# Patient Record
Sex: FEMALE | Race: WHITE | NOT HISPANIC OR LATINO | Employment: OTHER | URBAN - NONMETROPOLITAN AREA
[De-identification: names, ages, dates, MRNs, and addresses within clinical notes are randomized per-mention and may not be internally consistent; named-entity substitution may affect disease eponyms.]

---

## 2023-05-10 PROBLEM — M54.16 LUMBAR RADICULAR PAIN: Status: ACTIVE | Noted: 2023-05-10

## 2023-05-10 PROBLEM — N39.0 ACUTE UTI: Status: RESOLVED | Noted: 2023-05-10 | Resolved: 2023-05-10

## 2023-05-10 PROBLEM — N28.1 RENAL CYST: Status: ACTIVE | Noted: 2023-05-10

## 2023-05-10 PROBLEM — N30.01 ACUTE CYSTITIS WITH HEMATURIA: Status: ACTIVE | Noted: 2023-05-10

## 2023-05-10 PROBLEM — R30.0 DYSURIA: Status: RESOLVED | Noted: 2023-05-10 | Resolved: 2023-05-10

## 2023-05-10 PROBLEM — E78.5 HYPERLIPIDEMIA: Status: ACTIVE | Noted: 2023-05-10

## 2023-05-10 PROBLEM — R07.2 PRECORDIAL PAIN: Status: ACTIVE | Noted: 2023-05-10

## 2023-05-10 PROBLEM — H40.9 GLAUCOMA: Status: ACTIVE | Noted: 2023-05-10

## 2023-05-10 PROBLEM — R35.0 INCREASED FREQUENCY OF URINATION: Status: ACTIVE | Noted: 2023-05-10

## 2023-05-10 PROBLEM — R41.3 MEMORY LOSS: Status: ACTIVE | Noted: 2023-05-10

## 2023-05-10 PROBLEM — N39.3 SUI (STRESS URINARY INCONTINENCE, FEMALE): Status: ACTIVE | Noted: 2023-05-10

## 2023-05-10 PROBLEM — I10 BENIGN HYPERTENSION: Status: ACTIVE | Noted: 2023-05-10

## 2023-05-10 RX ORDER — ASPIRIN 81 MG/1
1 TABLET ORAL DAILY
COMMUNITY
Start: 2021-11-09

## 2023-05-10 RX ORDER — LISINOPRIL AND HYDROCHLOROTHIAZIDE 10; 12.5 MG/1; MG/1
1 TABLET ORAL DAILY
COMMUNITY
End: 2023-05-11 | Stop reason: SINTOL

## 2023-05-10 RX ORDER — CIPROFLOXACIN 250 MG/1
1 TABLET, FILM COATED ORAL EVERY 12 HOURS
COMMUNITY
Start: 2022-11-10 | End: 2023-05-11 | Stop reason: ALTCHOICE

## 2023-05-10 RX ORDER — ATORVASTATIN CALCIUM 40 MG/1
1 TABLET, FILM COATED ORAL DAILY
COMMUNITY
Start: 2021-11-09 | End: 2023-12-06 | Stop reason: SDUPTHER

## 2023-05-11 ENCOUNTER — OFFICE VISIT (OUTPATIENT)
Dept: PRIMARY CARE | Facility: CLINIC | Age: 73
End: 2023-05-11
Payer: MEDICARE

## 2023-05-11 VITALS
WEIGHT: 135.4 LBS | HEIGHT: 63 IN | OXYGEN SATURATION: 93 % | DIASTOLIC BLOOD PRESSURE: 80 MMHG | SYSTOLIC BLOOD PRESSURE: 110 MMHG | HEART RATE: 71 BPM | BODY MASS INDEX: 23.99 KG/M2

## 2023-05-11 DIAGNOSIS — E78.2 MIXED HYPERLIPIDEMIA: ICD-10-CM

## 2023-05-11 DIAGNOSIS — I10 BENIGN HYPERTENSION: ICD-10-CM

## 2023-05-11 DIAGNOSIS — N39.3 SUI (STRESS URINARY INCONTINENCE, FEMALE): Primary | ICD-10-CM

## 2023-05-11 DIAGNOSIS — R35.0 INCREASED FREQUENCY OF URINATION: ICD-10-CM

## 2023-05-11 LAB
POC APPEARANCE, URINE: CLEAR
POC BILIRUBIN, URINE: NEGATIVE
POC BLOOD, URINE: ABNORMAL
POC COLOR, URINE: YELLOW
POC GLUCOSE, URINE: NEGATIVE MG/DL
POC KETONES, URINE: NEGATIVE MG/DL
POC LEUKOCYTES, URINE: ABNORMAL
POC NITRITE,URINE: NEGATIVE
POC PH, URINE: 5.5 PH
POC PROTEIN, URINE: NEGATIVE MG/DL
POC SPECIFIC GRAVITY, URINE: 1.01
POC UROBILINOGEN, URINE: 0.2 EU/DL

## 2023-05-11 PROCEDURE — 1160F RVW MEDS BY RX/DR IN RCRD: CPT | Performed by: FAMILY MEDICINE

## 2023-05-11 PROCEDURE — 81003 URINALYSIS AUTO W/O SCOPE: CPT | Performed by: FAMILY MEDICINE

## 2023-05-11 PROCEDURE — 1159F MED LIST DOCD IN RCRD: CPT | Performed by: FAMILY MEDICINE

## 2023-05-11 PROCEDURE — 3079F DIAST BP 80-89 MM HG: CPT | Performed by: FAMILY MEDICINE

## 2023-05-11 PROCEDURE — 1036F TOBACCO NON-USER: CPT | Performed by: FAMILY MEDICINE

## 2023-05-11 PROCEDURE — 3074F SYST BP LT 130 MM HG: CPT | Performed by: FAMILY MEDICINE

## 2023-05-11 PROCEDURE — 99214 OFFICE O/P EST MOD 30 MIN: CPT | Performed by: FAMILY MEDICINE

## 2023-05-11 RX ORDER — HYDROCHLOROTHIAZIDE 12.5 MG/1
12.5 TABLET ORAL DAILY
Qty: 30 TABLET | Refills: 5 | Status: SHIPPED | OUTPATIENT
Start: 2023-05-11 | End: 2023-05-30 | Stop reason: SINTOL

## 2023-05-11 RX ORDER — LATANOPROST 50 UG/ML
SOLUTION/ DROPS OPHTHALMIC
COMMUNITY
Start: 2022-11-10

## 2023-05-11 RX ORDER — OXYBUTYNIN CHLORIDE 5 MG/1
5 TABLET, EXTENDED RELEASE ORAL DAILY
Qty: 30 TABLET | Refills: 11 | Status: SHIPPED | OUTPATIENT
Start: 2023-05-11 | End: 2023-08-07

## 2023-05-11 NOTE — PROGRESS NOTES
"Subjective   Patient ID: Penny Quevedo is a 72 y.o. female who presents for 6 mo fu.    HPI   Since the last office visit there have been no interval operations, hospitalizations, important illnesses or injuries.  HTN-Takes and tolerates meds without side effects. No alcohol. no tobacco. no exercise. low salt.  Reviewed recommendation for 150 minutes of exercise per week including 2 days of weight training if over age 50  Hyperlipidemia- is on a statin and a prudent diet.  Still with urinary freq. Nocturia x1, usi   Cough 6-8x a day will washout lisonprila nd use hydrochlorothiazide alone  Review of Systems  General-no fatigue weight to within 10 pounds  ENT no problems with vision swallowing  Cardiac no chest pains palpitations change in exercise tolerance or capacity  Pulmonary no cough shortness of breath  GI no heartburn or abdominal pain  Musculoskeletal no joint pains    Objective   /80   Pulse 71   Ht 1.6 m (5' 3\")   Wt 61.4 kg (135 lb 6.4 oz)   SpO2 93%   BMI 23.99 kg/m²     Physical Exam  General:  Alert, No acute distress. Appears stated age  Eye:  Pupils are equal, round and reactive to light, Extraocular movements are intact, Normal conjunctiva.    Neck:  Supple, Non-tender, No carotid bruit, No jugular venous distention, No lymphadenopathy, No thyromegaly.    Respiratory:  Lungs are clear to auscultation, Respirations are non-labored, Breath sounds are equal.    Cardiovascular:  Normal rate, Regular rhythm, No murmur.    Gastrointestinal:  Soft, Non-tender, No organomegaly. No solid or pulsatile mass  Integumentary:  Warm, Dry. No concerning lesions on exposed areas  Neurologic:  Alert, Oriented.  Gross and fine motor intact, CN 2-12 intact  Psychiatric:  Cooperative, Appropriate mood & affect.    Assessment/Plan          "

## 2023-05-30 ENCOUNTER — OFFICE VISIT (OUTPATIENT)
Dept: PRIMARY CARE | Facility: CLINIC | Age: 73
End: 2023-05-30
Payer: MEDICARE

## 2023-05-30 VITALS
WEIGHT: 135.5 LBS | HEIGHT: 63 IN | BODY MASS INDEX: 24.01 KG/M2 | OXYGEN SATURATION: 96 % | SYSTOLIC BLOOD PRESSURE: 110 MMHG | DIASTOLIC BLOOD PRESSURE: 58 MMHG | HEART RATE: 76 BPM

## 2023-05-30 DIAGNOSIS — R35.0 FREQUENCY OF URINATION: ICD-10-CM

## 2023-05-30 DIAGNOSIS — I10 BENIGN HYPERTENSION: Primary | ICD-10-CM

## 2023-05-30 LAB
POC APPEARANCE, URINE: ABNORMAL
POC BILIRUBIN, URINE: NEGATIVE
POC BLOOD, URINE: ABNORMAL
POC COLOR, URINE: YELLOW
POC GLUCOSE, URINE: NEGATIVE MG/DL
POC KETONES, URINE: NEGATIVE MG/DL
POC LEUKOCYTES, URINE: ABNORMAL
POC NITRITE,URINE: POSITIVE
POC PH, URINE: 7 PH
POC PROTEIN, URINE: NEGATIVE MG/DL
POC SPECIFIC GRAVITY, URINE: 1.01
POC UROBILINOGEN, URINE: 0.2 EU/DL

## 2023-05-30 PROCEDURE — 99214 OFFICE O/P EST MOD 30 MIN: CPT | Performed by: FAMILY MEDICINE

## 2023-05-30 PROCEDURE — 3074F SYST BP LT 130 MM HG: CPT | Performed by: FAMILY MEDICINE

## 2023-05-30 PROCEDURE — 3078F DIAST BP <80 MM HG: CPT | Performed by: FAMILY MEDICINE

## 2023-05-30 PROCEDURE — 1160F RVW MEDS BY RX/DR IN RCRD: CPT | Performed by: FAMILY MEDICINE

## 2023-05-30 PROCEDURE — 1036F TOBACCO NON-USER: CPT | Performed by: FAMILY MEDICINE

## 2023-05-30 PROCEDURE — 1159F MED LIST DOCD IN RCRD: CPT | Performed by: FAMILY MEDICINE

## 2023-05-30 PROCEDURE — 81003 URINALYSIS AUTO W/O SCOPE: CPT | Performed by: FAMILY MEDICINE

## 2023-05-30 RX ORDER — CIPROFLOXACIN 250 MG/1
250 TABLET, FILM COATED ORAL 2 TIMES DAILY
Qty: 14 TABLET | Refills: 3 | Status: SHIPPED | OUTPATIENT
Start: 2023-05-30 | End: 2023-06-06

## 2023-05-30 RX ORDER — LOSARTAN POTASSIUM 25 MG/1
25 TABLET ORAL DAILY
Qty: 30 TABLET | Refills: 11 | Status: SHIPPED | OUTPATIENT
Start: 2023-05-30 | End: 2023-08-07 | Stop reason: SINTOL

## 2023-05-30 NOTE — PROGRESS NOTES
"Subjective   Patient ID: Penny Quevedo is a 72 y.o. female who presents for UTI and BP concerns.    HPI   Uti f,urge,d,backain,  macrobid not helopful.  Cipro has been effective    HTN-home readings 135-150/ 60s. Renteria form hydrochlorothiazide, prev cough on lisinopril, will try losartan    Review of Systems general-no fatigue weight to within 10 pounds  ENT no problems with vision swallowing  Cardiac no chest pains palpitations change in exercise tolerance or capacity  Pulmonary no cough shortness of breath  GI no heartburn or abdominal pain  Musculoskeletal no joint pains    Objective   /58   Pulse 76   Ht 1.6 m (5' 3\")   Wt 61.5 kg (135 lb 8 oz)   SpO2 96%   BMI 24.00 kg/m²     Physical Exam  Heart regular without murmur.  Lungs clear to auscultation.  Neck no bruit thyroid nontender.  Assessment/Plan   Problem List Items Addressed This Visit          Circulatory    Benign hypertension - Primary    Relevant Medications    losartan (Cozaar) 25 mg tablet     Other Visit Diagnoses       Frequency of urination        Relevant Medications    ciprofloxacin (Cipro) 250 mg tablet    Other Relevant Orders    POCT UA Automated manually resulted (Completed)             Call bp in a month  \trial losartan 25  Cipro for uti  "

## 2023-06-19 ENCOUNTER — TELEPHONE (OUTPATIENT)
Dept: PRIMARY CARE | Facility: CLINIC | Age: 73
End: 2023-06-19
Payer: MEDICARE

## 2023-06-19 NOTE — TELEPHONE ENCOUNTER
CHANGED TO LOSARTAN  25MG   THE END OF MAY.   BP AVERAGE 130-170/ 70*80  .   NOT FEELING ANY BETTER.   GETTING HA'S

## 2023-06-27 ENCOUNTER — TELEPHONE (OUTPATIENT)
Dept: PRIMARY CARE | Facility: CLINIC | Age: 73
End: 2023-06-27
Payer: MEDICARE

## 2023-06-27 NOTE — TELEPHONE ENCOUNTER
PATIENT CALLED TO INFORM THAT HER DAUGHTER-IN-LAW IS ON LIFE SUPPORT. AN ISOLATED BP READING REPORTED 165/94. SHOULD SHE RESUME THE LISINOPRIL? OR, ADD THE LISINOPRIL TO THE LOSARTAN? SHE WILL BE LEAVING IN A FEW HOURS TO HEAD TO [place]

## 2023-08-07 ENCOUNTER — OFFICE VISIT (OUTPATIENT)
Dept: PRIMARY CARE | Facility: CLINIC | Age: 73
End: 2023-08-07
Payer: MEDICARE

## 2023-08-07 VITALS
BODY MASS INDEX: 23.96 KG/M2 | WEIGHT: 135.2 LBS | DIASTOLIC BLOOD PRESSURE: 74 MMHG | HEART RATE: 67 BPM | HEIGHT: 63 IN | OXYGEN SATURATION: 96 % | SYSTOLIC BLOOD PRESSURE: 136 MMHG

## 2023-08-07 DIAGNOSIS — I10 BENIGN HYPERTENSION: Primary | ICD-10-CM

## 2023-08-07 PROCEDURE — 1036F TOBACCO NON-USER: CPT | Performed by: NURSE PRACTITIONER

## 2023-08-07 PROCEDURE — 99213 OFFICE O/P EST LOW 20 MIN: CPT | Performed by: NURSE PRACTITIONER

## 2023-08-07 PROCEDURE — 3075F SYST BP GE 130 - 139MM HG: CPT | Performed by: NURSE PRACTITIONER

## 2023-08-07 PROCEDURE — 3078F DIAST BP <80 MM HG: CPT | Performed by: NURSE PRACTITIONER

## 2023-08-07 PROCEDURE — 1159F MED LIST DOCD IN RCRD: CPT | Performed by: NURSE PRACTITIONER

## 2023-08-07 PROCEDURE — 1160F RVW MEDS BY RX/DR IN RCRD: CPT | Performed by: NURSE PRACTITIONER

## 2023-08-07 RX ORDER — AMLODIPINE BESYLATE 5 MG/1
5 TABLET ORAL DAILY
Qty: 30 TABLET | Refills: 11 | Status: SHIPPED | OUTPATIENT
Start: 2023-08-07 | End: 2023-09-11 | Stop reason: DRUGHIGH

## 2023-08-07 ASSESSMENT — PATIENT HEALTH QUESTIONNAIRE - PHQ9
SUM OF ALL RESPONSES TO PHQ9 QUESTIONS 1 AND 2: 0
1. LITTLE INTEREST OR PLEASURE IN DOING THINGS: NOT AT ALL
2. FEELING DOWN, DEPRESSED OR HOPELESS: NOT AT ALL

## 2023-08-07 ASSESSMENT — ENCOUNTER SYMPTOMS
HEADACHES: 1
DIZZINESS: 0
LIGHT-HEADEDNESS: 0
PALPITATIONS: 0

## 2023-08-07 NOTE — PROGRESS NOTES
"Subjective   Patient ID: Penny Quevedo is a 73 y.o. female who presents for Hypertension (140's / 70-90's readings ).    Penny comes to the office, with her , to discuss elevated blood pressures.  States has a history of longstanding hypertension in which she took lisinopril for several years.  She did develop a cough on that medication it was discontinued and changed to losartan.  She stopped losartan back in June as she felt it caused brain fogginess and headaches.  She has been checking her blood pressures at home and getting  140's/70-90's.  States that she can feel a pressure in her head when her systolic blood pressure goes above 160's.  Previously had trial discontinuing losartan and restarting and symptoms returned.  Endorses no chest pain/syncopal episodes/visual disturbances  + fxhx HTN  Last labs from October 2022         Review of Systems   Eyes:  Negative for visual disturbance.   Cardiovascular:  Negative for chest pain, palpitations and leg swelling.   Neurological:  Positive for headaches. Negative for dizziness, syncope and light-headedness.       Objective   /74   Pulse 67   Ht 1.6 m (5' 3\")   Wt 61.3 kg (135 lb 3.2 oz)   SpO2 96%   BMI 23.95 kg/m²     Physical Exam  Vitals and nursing note reviewed.   Constitutional:       Appearance: Normal appearance.   Neck:      Thyroid: No thyromegaly.   Cardiovascular:      Rate and Rhythm: Normal rate and regular rhythm.      Heart sounds: Normal heart sounds.   Pulmonary:      Effort: Pulmonary effort is normal.      Breath sounds: Normal breath sounds.   Musculoskeletal:      Cervical back: Normal range of motion.      Right lower leg: No edema.      Left lower leg: No edema.   Skin:     General: Skin is warm and dry.   Neurological:      General: No focal deficit present.      Mental Status: She is alert and oriented to person, place, and time.   Psychiatric:         Mood and Affect: Mood normal.         Thought Content: Thought " content normal.         Assessment/Plan   Problem List Items Addressed This Visit       Benign hypertension - Primary    Relevant Medications    amLODIPine (Norvasc) 5 mg tablet    Other Relevant Orders    Follow Up In Primary Care - Established     1. Benign hypertension  Follow Up In Primary Care - Established    amLODIPine (Norvasc) 5 mg tablet    Start amlodipine 5 mg by mouth daily, check blood pressure 2-3 times per week and record.  Office visit in 4 to 6 weeks for recheck

## 2023-08-07 NOTE — PATIENT INSTRUCTIONS
Start amlodipine 5 mg by mouth daily  Check blood pressure 2-3 times per week and record.  Office visit in 4 to 6 weeks  Discussed weight loss, reduction of sodium intake to less than 2.4G daily and 30' physical activity most days of the week. Discussed DASH eating plan with avoidance of foods high in saturated/trans fats, limit sugar-sweetened beverages/foods, increase vegetable/fruit/whole grain consumption. Choose low-fat dairy products, fish, poultry, beans & nuts.

## 2023-09-10 ASSESSMENT — ENCOUNTER SYMPTOMS
HEADACHES: 0
WEAKNESS: 0
PALPITATIONS: 0
DIZZINESS: 0
LIGHT-HEADEDNESS: 0
SHORTNESS OF BREATH: 0

## 2023-09-10 NOTE — PROGRESS NOTES
"Subjective   Patient ID: Penny Quevedo is a 73 y.o. female who presents for Hypertension (Pt states blood pressure running higher than she would like ).    Penny comes to the office for a 1MO virgil on HTN.  States has a history of longstanding hypertension in which she took lisinopril for several years.  She did develop a cough on that medication it was discontinued and changed to losartan.  She stopped losartan back in June as she felt it caused brain fogginess and headaches.   States that she can feel a pressure in her head when her systolic blood pressure goes above 160's.  Previously had trial discontinuing losartan and restarting and symptoms returned.  Endorses no chest pain/syncopal episodes/visual disturbances. Started on Amlodipine 5mg 1 MO ago. Community BP Readings: 135-138/70-80's. Taking & tolerating Amlodipine.  Physical activity: walking daily  Schedule MMG after 11/30/23  Keep OV w/ MDS in Nov.  Depression screening completed today.  PHQ-2 score: 0.  Will re-evaluate annually and as needed           Review of Systems   HENT:          + head pressure w/ elevated BP readings   Eyes:  Negative for visual disturbance.   Respiratory:  Negative for shortness of breath.    Cardiovascular:  Negative for chest pain, palpitations and leg swelling.   Gastrointestinal:  Negative for constipation.   Neurological:  Negative for dizziness, weakness, light-headedness and headaches.   Psychiatric/Behavioral:          + recent increased stress in life       Objective   /70   Pulse 72   Ht 1.6 m (5' 3\")   Wt 62.2 kg (137 lb 1.6 oz)   SpO2 95%   BMI 24.29 kg/m²     Physical Exam  Vitals and nursing note reviewed.   Constitutional:       Appearance: Normal appearance.   HENT:      Head: Normocephalic.   Neck:      Vascular: No carotid bruit.   Cardiovascular:      Rate and Rhythm: Normal rate and regular rhythm.      Heart sounds: Normal heart sounds.   Pulmonary:      Effort: Pulmonary effort is normal. "      Breath sounds: Normal breath sounds.   Musculoskeletal:      Cervical back: Normal range of motion.      Right lower leg: No edema.      Left lower leg: No edema.   Skin:     General: Skin is warm and dry.   Neurological:      General: No focal deficit present.      Mental Status: She is alert and oriented to person, place, and time.   Psychiatric:         Mood and Affect: Mood normal.         Thought Content: Thought content normal.         Assessment/Plan   Problem List Items Addressed This Visit       Benign hypertension    Relevant Medications    amLODIPine (Norvasc) 10 mg tablet    Encounter for screening mammogram for malignant neoplasm of breast - Primary    Relevant Orders    BI mammo bilateral screening tomosynthesis

## 2023-09-11 ENCOUNTER — OFFICE VISIT (OUTPATIENT)
Dept: PRIMARY CARE | Facility: CLINIC | Age: 73
End: 2023-09-11
Payer: MEDICARE

## 2023-09-11 ENCOUNTER — TELEPHONE (OUTPATIENT)
Dept: PRIMARY CARE | Facility: CLINIC | Age: 73
End: 2023-09-11

## 2023-09-11 VITALS
SYSTOLIC BLOOD PRESSURE: 128 MMHG | HEART RATE: 72 BPM | OXYGEN SATURATION: 95 % | DIASTOLIC BLOOD PRESSURE: 70 MMHG | BODY MASS INDEX: 24.29 KG/M2 | WEIGHT: 137.1 LBS | HEIGHT: 63 IN

## 2023-09-11 DIAGNOSIS — Z12.31 ENCOUNTER FOR SCREENING MAMMOGRAM FOR MALIGNANT NEOPLASM OF BREAST: Primary | ICD-10-CM

## 2023-09-11 DIAGNOSIS — I10 BENIGN HYPERTENSION: ICD-10-CM

## 2023-09-11 PROCEDURE — 99213 OFFICE O/P EST LOW 20 MIN: CPT | Performed by: NURSE PRACTITIONER

## 2023-09-11 PROCEDURE — G0444 DEPRESSION SCREEN ANNUAL: HCPCS | Performed by: NURSE PRACTITIONER

## 2023-09-11 PROCEDURE — 1159F MED LIST DOCD IN RCRD: CPT | Performed by: NURSE PRACTITIONER

## 2023-09-11 PROCEDURE — 1160F RVW MEDS BY RX/DR IN RCRD: CPT | Performed by: NURSE PRACTITIONER

## 2023-09-11 PROCEDURE — 3074F SYST BP LT 130 MM HG: CPT | Performed by: NURSE PRACTITIONER

## 2023-09-11 PROCEDURE — 1036F TOBACCO NON-USER: CPT | Performed by: NURSE PRACTITIONER

## 2023-09-11 PROCEDURE — 3078F DIAST BP <80 MM HG: CPT | Performed by: NURSE PRACTITIONER

## 2023-09-11 RX ORDER — AMLODIPINE BESYLATE 10 MG/1
10 TABLET ORAL DAILY
Qty: 90 TABLET | Refills: 3 | Status: SHIPPED | OUTPATIENT
Start: 2023-09-11 | End: 2024-09-10

## 2023-09-11 ASSESSMENT — PATIENT HEALTH QUESTIONNAIRE - PHQ9
SUM OF ALL RESPONSES TO PHQ9 QUESTIONS 1 AND 2: 0
2. FEELING DOWN, DEPRESSED OR HOPELESS: NOT AT ALL
1. LITTLE INTEREST OR PLEASURE IN DOING THINGS: NOT AT ALL

## 2023-09-11 ASSESSMENT — ENCOUNTER SYMPTOMS: CONSTIPATION: 0

## 2023-09-11 NOTE — PATIENT INSTRUCTIONS
Increase amlodipine to 10 mg by mouth daily.  Check blood pressures 2-3 times per week and record.  Keep appointment with Dr. Mohamud in November.  Complete your mammography in December  For any new medications that were prescribed today, the patient was educated about their indications for use, administration, frequency and potential side effects of the medication.

## 2023-09-11 NOTE — TELEPHONE ENCOUNTER
SCHED BENJAMING ON [facility] AT [facility] FOR 12/04/23 AT 11:10.  PT GIVEN INSTRUCTIONS.

## 2023-11-28 ENCOUNTER — LAB (OUTPATIENT)
Dept: LAB | Facility: LAB | Age: 73
End: 2023-11-28
Payer: MEDICARE

## 2023-11-28 DIAGNOSIS — I10 BENIGN HYPERTENSION: ICD-10-CM

## 2023-11-28 DIAGNOSIS — E78.2 MIXED HYPERLIPIDEMIA: ICD-10-CM

## 2023-11-28 LAB
ALBUMIN SERPL BCP-MCNC: 4.2 G/DL (ref 3.4–5)
ALP SERPL-CCNC: 142 U/L (ref 33–136)
ALT SERPL W P-5'-P-CCNC: 16 U/L (ref 7–45)
ANION GAP SERPL CALC-SCNC: 12 MMOL/L (ref 10–20)
AST SERPL W P-5'-P-CCNC: 21 U/L (ref 9–39)
BILIRUB SERPL-MCNC: 0.6 MG/DL (ref 0–1.2)
BUN SERPL-MCNC: 25 MG/DL (ref 6–23)
CALCIUM SERPL-MCNC: 9.7 MG/DL (ref 8.6–10.3)
CHLORIDE SERPL-SCNC: 105 MMOL/L (ref 98–107)
CHOLEST SERPL-MCNC: 147 MG/DL (ref 0–199)
CHOLESTEROL/HDL RATIO: 2.5
CO2 SERPL-SCNC: 28 MMOL/L (ref 21–32)
CREAT SERPL-MCNC: 0.76 MG/DL (ref 0.5–1.05)
ERYTHROCYTE [DISTWIDTH] IN BLOOD BY AUTOMATED COUNT: 13.5 % (ref 11.5–14.5)
GFR SERPL CREATININE-BSD FRML MDRD: 82 ML/MIN/1.73M*2
GLUCOSE SERPL-MCNC: 89 MG/DL (ref 74–99)
HCT VFR BLD AUTO: 42.8 % (ref 36–46)
HDLC SERPL-MCNC: 59 MG/DL
HGB BLD-MCNC: 13.4 G/DL (ref 12–16)
LDLC SERPL CALC-MCNC: 75 MG/DL
MCH RBC QN AUTO: 29.3 PG (ref 26–34)
MCHC RBC AUTO-ENTMCNC: 31.3 G/DL (ref 32–36)
MCV RBC AUTO: 94 FL (ref 80–100)
NON HDL CHOLESTEROL: 88 MG/DL (ref 0–149)
NRBC BLD-RTO: 0 /100 WBCS (ref 0–0)
PLATELET # BLD AUTO: 177 X10*3/UL (ref 150–450)
POTASSIUM SERPL-SCNC: 4.1 MMOL/L (ref 3.5–5.3)
PROT SERPL-MCNC: 6.9 G/DL (ref 6.4–8.2)
RBC # BLD AUTO: 4.57 X10*6/UL (ref 4–5.2)
SODIUM SERPL-SCNC: 141 MMOL/L (ref 136–145)
TRIGL SERPL-MCNC: 64 MG/DL (ref 0–149)
VLDL: 13 MG/DL (ref 0–40)
WBC # BLD AUTO: 6.5 X10*3/UL (ref 4.4–11.3)

## 2023-11-28 PROCEDURE — 80053 COMPREHEN METABOLIC PANEL: CPT

## 2023-11-28 PROCEDURE — 85027 COMPLETE CBC AUTOMATED: CPT

## 2023-11-28 PROCEDURE — 80061 LIPID PANEL: CPT

## 2023-11-28 PROCEDURE — 36415 COLL VENOUS BLD VENIPUNCTURE: CPT

## 2023-12-04 ENCOUNTER — APPOINTMENT (OUTPATIENT)
Dept: RADIOLOGY | Facility: CLINIC | Age: 73
End: 2023-12-04
Payer: MEDICARE

## 2023-12-04 ENCOUNTER — TELEPHONE (OUTPATIENT)
Dept: PRIMARY CARE | Facility: CLINIC | Age: 73
End: 2023-12-04
Payer: MEDICARE

## 2023-12-04 ENCOUNTER — ANCILLARY PROCEDURE (OUTPATIENT)
Dept: RADIOLOGY | Facility: CLINIC | Age: 73
End: 2023-12-04
Payer: MEDICARE

## 2023-12-04 DIAGNOSIS — Z12.31 ENCOUNTER FOR SCREENING MAMMOGRAM FOR MALIGNANT NEOPLASM OF BREAST: ICD-10-CM

## 2023-12-04 PROCEDURE — 77067 SCR MAMMO BI INCL CAD: CPT | Mod: BILATERAL PROCEDURE | Performed by: RADIOLOGY

## 2023-12-04 PROCEDURE — 77067 SCR MAMMO BI INCL CAD: CPT

## 2023-12-04 PROCEDURE — 77063 BREAST TOMOSYNTHESIS BI: CPT | Mod: BILATERAL PROCEDURE | Performed by: RADIOLOGY

## 2023-12-04 NOTE — TELEPHONE ENCOUNTER
----- Message from ERICK Vega sent at 12/4/2023  3:48 PM EST -----  Please call and notify patient her mammogram is normal

## 2023-12-06 ENCOUNTER — OFFICE VISIT (OUTPATIENT)
Dept: PRIMARY CARE | Facility: CLINIC | Age: 73
End: 2023-12-06
Payer: MEDICARE

## 2023-12-06 VITALS
DIASTOLIC BLOOD PRESSURE: 80 MMHG | WEIGHT: 137.6 LBS | HEIGHT: 63 IN | OXYGEN SATURATION: 96 % | BODY MASS INDEX: 24.38 KG/M2 | SYSTOLIC BLOOD PRESSURE: 140 MMHG | HEART RATE: 71 BPM

## 2023-12-06 DIAGNOSIS — E78.2 MIXED HYPERLIPIDEMIA: ICD-10-CM

## 2023-12-06 DIAGNOSIS — I10 BENIGN HYPERTENSION: ICD-10-CM

## 2023-12-06 DIAGNOSIS — R35.0 INCREASED FREQUENCY OF URINATION: ICD-10-CM

## 2023-12-06 DIAGNOSIS — N39.3 SUI (STRESS URINARY INCONTINENCE, FEMALE): ICD-10-CM

## 2023-12-06 DIAGNOSIS — Z00.00 ROUTINE GENERAL MEDICAL EXAMINATION AT HEALTH CARE FACILITY: Primary | ICD-10-CM

## 2023-12-06 PROCEDURE — 1170F FXNL STATUS ASSESSED: CPT | Performed by: FAMILY MEDICINE

## 2023-12-06 PROCEDURE — 1159F MED LIST DOCD IN RCRD: CPT | Performed by: FAMILY MEDICINE

## 2023-12-06 PROCEDURE — 99214 OFFICE O/P EST MOD 30 MIN: CPT | Performed by: FAMILY MEDICINE

## 2023-12-06 PROCEDURE — 1160F RVW MEDS BY RX/DR IN RCRD: CPT | Performed by: FAMILY MEDICINE

## 2023-12-06 PROCEDURE — 1036F TOBACCO NON-USER: CPT | Performed by: FAMILY MEDICINE

## 2023-12-06 PROCEDURE — 3077F SYST BP >= 140 MM HG: CPT | Performed by: FAMILY MEDICINE

## 2023-12-06 PROCEDURE — 3079F DIAST BP 80-89 MM HG: CPT | Performed by: FAMILY MEDICINE

## 2023-12-06 PROCEDURE — G0439 PPPS, SUBSEQ VISIT: HCPCS | Performed by: FAMILY MEDICINE

## 2023-12-06 RX ORDER — ATORVASTATIN CALCIUM 40 MG/1
40 TABLET, FILM COATED ORAL DAILY
Qty: 90 TABLET | Refills: 3 | Status: SHIPPED | OUTPATIENT
Start: 2023-12-06 | End: 2024-12-05

## 2023-12-06 ASSESSMENT — ENCOUNTER SYMPTOMS
LOSS OF SENSATION IN FEET: 0
OCCASIONAL FEELINGS OF UNSTEADINESS: 0
DEPRESSION: 0

## 2023-12-06 ASSESSMENT — ACTIVITIES OF DAILY LIVING (ADL)
DOING_HOUSEWORK: INDEPENDENT
TAKING_MEDICATION: INDEPENDENT
MANAGING_FINANCES: INDEPENDENT
GROCERY_SHOPPING: INDEPENDENT
BATHING: INDEPENDENT
DRESSING: INDEPENDENT

## 2023-12-06 NOTE — PROGRESS NOTES
"Subjective   Patient ID: Penny Quevedo is a 73 y.o. female who presents for Medicare Annual Wellness Visit Subsequent.    HPI   Since the last office visit there have been no interval operations, hospitalizations, important illnesses or injuries.  HTN-Takes and tolerates meds without side effects. No alcohol. no tobacco. no exercise. low salt.  Reviewed recommendation for 150 minutes of exercise per week including 2 days of weight training if over age 50  Had reduced amlodiine to 5 mgand bp isavg <130/<75  Hyperlipidemia- is on a statin and a prudent diet.    Review of Systems  General-no fatigue weight to within 10 pounds  ENT no problems with vision swallowing  Cardiac no chest pains palpitations change in exercise tolerance or capacity  Pulmonary no cough shortness of breath  GI no heartburn or abdominal pain  Musculoskeletal no joint pains  Objective   /80   Pulse 71   Ht 1.6 m (5' 3\")   Wt 62.4 kg (137 lb 9.6 oz)   SpO2 96%   BMI 24.37 kg/m²     Physical Exam  General:  Alert, No acute distress. Appears stated age  Eye:  Pupils are equal, round and reactive to light, Extraocular movements are intact, Normal conjunctiva.    Neck:  Supple, Non-tender, No carotid bruit, No jugular venous distention, No lymphadenopathy, No thyromegaly.    Respiratory:  Lungs are clear to auscultation, Respirations are non-labored, Breath sounds are equal.    Cardiovascular:  Normal rate, Regular rhythm, No murmur.    Gastrointestinal:  Soft, Non-tender, No organomegaly. No solid or pulsatile mass  Integumentary:  Warm, Dry. No concerning lesions on exposed areas  Neurologic:  Alert, Oriented.  Gross and fine motor intact, CN 2-12 intact  Psychiatric:  Cooperative, Appropriate mood & affect.  Assessment/Plan   Problem List Items Addressed This Visit             ICD-10-CM    Benign hypertension I10    Relevant Orders    Follow Up In Primary Care    Hyperlipidemia E78.5    Relevant Medications    atorvastatin (Lipitor) " 40 mg tablet    Other Relevant Orders    Comprehensive Metabolic Panel    Gamma GT    Follow Up In Primary Care    Increased frequency of urination R35.0    PILAR (stress urinary incontinence, female) N39.3     Other Visit Diagnoses         Codes    Routine general medical examination at health care facility    -  Primary Z00.00

## 2023-12-06 NOTE — PROGRESS NOTES
"Subjective   Reason for Visit: Penny Quevedo is an 73 y.o. female here for a Medicare Wellness visit.     Past Medical, Surgical, and Family History reviewed and updated in chart.    Reviewed all medications by prescribing practitioner or clinical pharmacist (such as prescriptions, OTCs, herbal therapies and supplements) and documented in the medical record.    HPI    Patient Care Team:  Viktor Mohamud MD as PCP - General  Viktor Mohamud MD as PCP - [employer] PCP     Review of Systems    Objective   Vitals:  /80   Pulse 71   Ht 1.6 m (5' 3\")   Wt 62.4 kg (137 lb 9.6 oz)   SpO2 96%   BMI 24.37 kg/m²       Physical Exam    Assessment/Plan   Problem List Items Addressed This Visit     Benign hypertension    Hyperlipidemia    Increased frequency of urination    PILAR (stress urinary incontinence, female)   Other Visit Diagnoses     Routine general medical examination at health care facility    -  Primary             "

## 2024-01-04 ENCOUNTER — LAB (OUTPATIENT)
Dept: LAB | Facility: LAB | Age: 74
End: 2024-01-04
Payer: MEDICARE

## 2024-01-04 DIAGNOSIS — E78.2 MIXED HYPERLIPIDEMIA: ICD-10-CM

## 2024-01-04 LAB
ALBUMIN SERPL BCP-MCNC: 4.1 G/DL (ref 3.4–5)
ALP SERPL-CCNC: 143 U/L (ref 33–136)
ALT SERPL W P-5'-P-CCNC: 21 U/L (ref 7–45)
ANION GAP SERPL CALC-SCNC: 12 MMOL/L (ref 10–20)
AST SERPL W P-5'-P-CCNC: 24 U/L (ref 9–39)
BILIRUB SERPL-MCNC: 0.7 MG/DL (ref 0–1.2)
BUN SERPL-MCNC: 18 MG/DL (ref 6–23)
CALCIUM SERPL-MCNC: 9.4 MG/DL (ref 8.6–10.3)
CHLORIDE SERPL-SCNC: 105 MMOL/L (ref 98–107)
CO2 SERPL-SCNC: 29 MMOL/L (ref 21–32)
CREAT SERPL-MCNC: 0.7 MG/DL (ref 0.5–1.05)
GFR SERPL CREATININE-BSD FRML MDRD: >90 ML/MIN/1.73M*2
GGT SERPL-CCNC: 13 U/L (ref 5–55)
GLUCOSE SERPL-MCNC: 87 MG/DL (ref 74–99)
POTASSIUM SERPL-SCNC: 4 MMOL/L (ref 3.5–5.3)
PROT SERPL-MCNC: 6.4 G/DL (ref 6.4–8.2)
SODIUM SERPL-SCNC: 142 MMOL/L (ref 136–145)

## 2024-01-04 PROCEDURE — 80053 COMPREHEN METABOLIC PANEL: CPT

## 2024-01-04 PROCEDURE — 82977 ASSAY OF GGT: CPT

## 2024-01-04 PROCEDURE — 36415 COLL VENOUS BLD VENIPUNCTURE: CPT

## 2024-01-25 ENCOUNTER — TELEPHONE (OUTPATIENT)
Dept: PRIMARY CARE | Facility: CLINIC | Age: 74
End: 2024-01-25
Payer: MEDICARE

## 2024-03-15 ENCOUNTER — HOSPITAL ENCOUNTER (EMERGENCY)
Age: 74
LOS: 1 days | Discharge: HOME | End: 2024-03-16
Payer: MEDICARE

## 2024-03-15 VITALS
OXYGEN SATURATION: 97 % | SYSTOLIC BLOOD PRESSURE: 165 MMHG | RESPIRATION RATE: 16 BRPM | DIASTOLIC BLOOD PRESSURE: 80 MMHG | TEMPERATURE: 97 F | HEART RATE: 87 BPM

## 2024-03-15 VITALS — BODY MASS INDEX: 25.4 KG/M2

## 2024-03-15 VITALS
DIASTOLIC BLOOD PRESSURE: 66 MMHG | HEART RATE: 75 BPM | OXYGEN SATURATION: 95 % | SYSTOLIC BLOOD PRESSURE: 128 MMHG | RESPIRATION RATE: 16 BRPM

## 2024-03-15 DIAGNOSIS — Z79.82: ICD-10-CM

## 2024-03-15 DIAGNOSIS — I10: ICD-10-CM

## 2024-03-15 DIAGNOSIS — E78.5: ICD-10-CM

## 2024-03-15 DIAGNOSIS — S82.843A: Primary | ICD-10-CM
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
## 2024-03-15 LAB
ANION GAP: 5 (ref 5–15)
ANISOCYTOSIS BLD QL SMEAR: (no result)
BUN SERPL-MCNC: 24 MG/DL (ref 7–18)
BUN/CREAT RATIO: 31.2 RATIO (ref 10–20)
CALCIUM SERPL-MCNC: 8.9 MG/DL (ref 8.5–10.1)
CARBON DIOXIDE: 27 MMOL/L (ref 21–32)
CHLORIDE: 109 MMOL/L (ref 98–107)
DEPRECATED RDW RBC: 43.6 FL (ref 35.1–43.9)
DIFFERENTIAL INDICATED: (no result)
ERYTHROCYTE [DISTWIDTH] IN BLOOD: 13.4 % (ref 11.6–14.6)
EST GLOM FILT RATE - AFR AMER: 94 ML/MIN (ref 60–?)
ESTIMATED CREATININE CLEARANCE: 55.98 ML/MIN
GLUCOSE: 112 MG/DL (ref 74–106)
HCT VFR BLD AUTO: 41.3 % (ref 37–47)
HGB BLD-MCNC: 13.6 G/DL (ref 12–15)
IMMATURE GRANULOCYTES COUNT: 0.04 X10^3/UL (ref 0–0)
MCV RBC: 88.6 FL (ref 81–99)
MEAN CORP HGB CONC: 32.9 G/DL (ref 32–36)
MEAN PLATELET VOL.: 13.5 FL (ref 6.2–12)
NRBC FLAGGED BY ANALYZER: 0 % (ref 0–5)
PLATELET # BLD: 155 K/MM3 (ref 150–450)
POSITIVE COUNT: YES
POTASSIUM: 4 MMOL/L (ref 3.5–5.1)
RBC # BLD AUTO: 4.66 M/MM3 (ref 4.2–5.4)
RBC MORPH BLD: (no result) NORMAL
SCAN SMEAR PER REVIEW CRITERIA: (no result)
WBC # BLD AUTO: 12.9 K/MM3 (ref 4.4–11)

## 2024-03-15 PROCEDURE — 80048 BASIC METABOLIC PNL TOTAL CA: CPT

## 2024-03-15 PROCEDURE — A4216 STERILE WATER/SALINE, 10 ML: HCPCS

## 2024-03-15 PROCEDURE — 85025 COMPLETE CBC W/AUTO DIFF WBC: CPT

## 2024-03-15 PROCEDURE — 96374 THER/PROPH/DIAG INJ IV PUSH: CPT

## 2024-03-15 PROCEDURE — 73700 CT LOWER EXTREMITY W/O DYE: CPT

## 2024-03-15 PROCEDURE — 96375 TX/PRO/DX INJ NEW DRUG ADDON: CPT

## 2024-03-15 PROCEDURE — 29515 APPLICATION SHORT LEG SPLINT: CPT

## 2024-03-15 PROCEDURE — 73610 X-RAY EXAM OF ANKLE: CPT

## 2024-03-15 PROCEDURE — 99282 EMERGENCY DEPT VISIT SF MDM: CPT

## 2024-03-15 NOTE — XMS RPT_ITS
Comprehensive CCD (C-CDA v2.1)  
  
                           Created on: March 15, 2024  
  
  
ELLA CHARLES  
External Reference #: [id number]  
: 1950  
Sex: Female  
  
Demographics  
  
  
                                        Address             [address]  
[place]  [address]  
   
                                        Home Phone          [phone]  
   
                                        Preferred Language  en  
   
                                        Marital Status        
   
                                        M[employer] Affiliation Unknown  
   
                                        Race                White  
   
                                        Ethnic Group        Not  or Lati  
no  
  
  
Author  
  
  
                                        Name                Unknown  
   
                                        Address             [address]  
[address]  
[place]  [address]  
   
                                        Phone               [phone]  
   
                                        Organization        [facility]  
  
  
Care Team Providers  
  
  
                                Care Team Member Name Role            Phone  
   
                                Layo Colindres Unavailable     Unavailable  
   
                                StenLayo rico Unavailable     Unavailable  
   
                                Layo Colindres Unavailable     [phone]  
1  
   
                                Unavailable     Unavailable     [phone]  
   
                                TEMITOPE MCKENO Attending       Unavailab  
le  
   
                                TEMITOPE MCKEON Admitting       Unavailab  
le  
   
                                STENCEL, LAYO JOYCE Primary Care    Unavailab  
le  
   
                                StenLayo rico Primary Care Provider [phone]  
  
   
                                Unavailable     Unavailable     [phone]  
   
                                Bernadine, Layo Joyce Primary Care    Unavailab  
le  
   
                                Wood, MsRandy Chandler Attending       Unav  
ailable  
   
                                Wood, MsRandy Chandler Referring       Unav  
ailable  
   
                                Stencel, Layo Joyce Referring       Unavailab  
le  
   
                                Stencel, Layo Joyce Primary Care    Unavailab  
le  
   
                                Stencel, Layo Joyce Attending       Unavailab  
le  
   
                                Stencel, Layo Joyce Referring       Unavailab  
le  
   
                                Stencel, Layo Joyce Primary Care    Unavailab  
le  
   
                                Stencel, Layo Joyce Attending       Unavailab  
le  
   
                                Stencel, Layo Joyce Referring       Unavailab  
le  
   
                                Stencel, Layo Joyce Primary Care    Unavailab  
le  
   
                                Stencel, Layo Joyce Attending       Unavailab  
le  
   
                                Stencel, Layo Joyce Attending       Unavailab  
le  
   
                                Stencel, Layo Joyce Primary Care    Unavailab  
le  
   
                                StencelLayo Primary Care Provider [phone]  
  
   
                                Layo Colindres MD Primary Care Provider [phone]  
[phone]  
   
                                Layo Colindres MD Unavailable     [phone]  
[phone]  
   
                                Layo Colindres MD Primary Care Provider [phone]  
[phone]  
   
                                Layo Colindres MD Unavailable     [phone]  
[phone]  
   
                                Layo Colindres MD Primary Care Provider [phone]  
[phone]  
   
                                LAYO COLINDRES Primary Care    Unavailable  
   
                                ADA MERCADO II Attending       Unavailabl  
e  
   
                                STENCEL, LAYO D Primary Care    Unavailable  
   
                                COOPERRIDER II, ADA MARTIENZ Attending       Unavailabl  
e  
   
                                COOPERRIDER II, ADA MARTINEZ Referring       Unavailabl  
e  
   
                                COOPERRIDER II, ADA MARTINEZ Attending       Unavailabl  
e  
   
                                STENCEL, LAYO D Primary Care    Unavailable  
   
                                STENCEL, LAYO D Primary Care    Unavailable  
   
                                WOOD, ALEENA HERNADEZ Attending       Unavailable  
   
                                STENCEL, LAYO D Primary Care    Unavailable  
   
                                WOOD, ALEENA L Attending       Unavailable  
   
                                WOOD, ALEENA L Referring       Unavailable  
   
                                STENCEL, LAYO D Primary Care    Unavailable  
   
                                STENCEL, LAYO D Attending       Unavailable  
   
                                STENCEL, LAYO D Referring       Unavailable  
   
                                STENCEL, LAYO D Primary Care    Unavailable  
   
                                STENCEL, LAYO D Attending       Unavailable  
   
                                STENCEL, LAYO D Primary Care    Unavailable  
   
                                STENCEL, LAYO D Attending       Unavailable  
   
                                STENCEL, LAYO D Primary Care    Unavailable  
   
                                WOOD, ALEENA L Referring       Unavailable  
   
                                STENCEL, LAYO D Primary Care    Unavailable  
  
  
  
Medications  
Current Medications  
  
  
  
                      Medication Drug Class(es) Dates      Sig (Normalized) Sig   
(Original)  
   
                                                    aspirin 81 mg   
delayed release   
oral tablet  
(20 sources)                            Platelet   
Aggregation   
Inhibitor,   
Nonsteroidal   
Anti-inflammatory   
Drug                                    Start:   
2021                              take 1 tablet by   
mouth once daily                        aspirin 81 mg EC   
tablet Take 1   
tablet (81 mg) by   
mouth once daily.   
0 2021   
Active  
  
  
  
                                          
   
                                          
   
                                          
   
                                          
   
                                          
   
                                          
   
                                          
   
                                          
   
                                          
   
                                          
   
                                          
   
                                          
   
                                          
   
                                          
  
  
  
Completed/Discontinued Medications  
  
  
  
                      Medication Drug Class(es) Dates      Sig (Normalized) Sig   
(Original)  
   
                                                    amLODIPine 10 mg   
oral tablet  
(5 sources)                             Dihydropyridine   
Calcium Channel   
Blocker                                 Start:   
10-                              take 5 mg by mouth   
once                                    amLODIPine   
(NORVASC) 10 mg   
tablet Take 5 mg   
by mouth every   
afternoon. 0   
10/10/2023   
Active  
  
  
  
                                          
   
                                          
   
                                          
   
                                          
   
                                          
   
                                          
   
                                          
   
                                          
   
                                          
   
                                          
   
                                          
   
                                          
   
                                          
   
                                          
   
                                          
   
                                          
   
                                          
   
                                          
  
  
  
Problems  
Active Problems  
  
  
                      Problem Classification Problem    Date       Documented Da  
te Episodic/Chronic  
   
                                                    Cataract  
(2 sources)                             Bilateral senile   
combined form   
cataracts of eyes;   
Translations:   
[Combined forms of   
age-related   
cataract,   
bilateral]                                                  Chronic  
   
                                                    Disorders of lipid   
metabolism  
(20 sources)                            Hyperlipidemia;   
Translations:   
[Other and   
unspecified   
hyperlipidemia]                         Onset:   
05-                05-                Chronic  
   
                                                    Essential hypertension  
(20 sources)                            Benign   
hypertension;   
Translations:   
[Benign essential   
hypertension]                           Onset:   
05-                05-                Chronic  
   
                                                    Genitourinary symptoms   
and ill-defined   
conditions  
(20 sources)                            Female stress   
incontinence;   
Translations:   
[Stress   
incontinence,   
female]                                 Onset:   
05-                05-                Chronic  
   
                                                    Genitourinary symptoms   
and ill-defined   
conditions  
(20 sources)                            Increased frequency   
of urination;   
Translations:   
[Urinary frequency]                     Onset:   
05-  
Resolved:   
05-                05-                Episodic  
   
                                                    Glaucoma  
(20 sources)                            Glaucoma;   
Translations:   
[Unspecified   
glaucoma]                               Onset:   
2016                                          Chronic  
   
                                                    Immunizations and   
screening for   
infectious disease  
(20 sources)                            Patient encounter   
status;   
Translations:   
[Other specified   
vaccination]                            Onset:   
2023                Episodic  
   
                                                    Other eye disorders  
(1 source)                              Ptosis of eyelid;   
Translations:   
[Unspecified ptosis   
of bilateral   
eyelids]                                10-          Episodic  
   
                                                    Other screening for   
suspected conditions   
(not mental disorders   
or infectious disease)  
(8 sources)                             Encounter for   
screening mammogram   
for malignant   
neoplasm of breast;   
Translations:   
[Encntr screen   
mammogram for   
malignant neoplasm   
of breast]                              Onset:   
2022                                          Episodic  
   
                                                    Residual codes;   
unclassified  
(3 sources)                             Menopause present;   
Translations:   
[Menopause]                                                 Chronic  
   
                                                    Residual codes;   
unclassified  
(15 sources)                            Menopause present;   
Translations:   
[Symptomatic   
menopausal or   
female climacteric   
states]                                                     Episodic  
   
                                                    Residual codes;   
unclassified  
(3 sources)                             Body mass index   
20-24 - normal;   
Translations: [Body   
Mass Index between   
19-24, adult]                                               Episodic  
   
                                                    Residual codes;   
unclassified  
(2 sources)                             Other amnesia;   
Translations:   
[Other amnesia]                         Onset:   
2022                                          Episodic  
  
  
Past or Other Problems  
  
  
                      Problem Classification Problem    Date       Documented Da  
te Episodic/Chronic  
   
                                                    Blindness and vision   
defects  
(12 sources)                            Regular   
astigmatism;   
Translations:   
[Regular   
astigmatism,   
unspecified eye]                        Onset:   
04-                04-                Episodic  
   
                                                    Nonspecific chest pain  
(17 sources)                            Precordial pain;   
Translations:   
[Precordial pain]                       Onset:   
05-                05-                Episodic  
   
                                                    Other diseases of   
kidney and ureters  
(20 sources)                            Cyst of kidney;   
Translations:   
[Cystic kidney   
disease,   
unspecified]                            Onset:   
05-                05-                Episodic  
   
                                                    Other diseases of   
kidney and ureters  
(3 sources)                             Cyst of kidney;   
Translations:   
[Renal cyst]                                                  
   
                                                    Residual codes;   
unclassified  
(20 sources)                            Past history of   
procedure;   
Translations:   
[Other specified   
personal history   
presenting hazards   
to health]                              Onset:   
2020                                          Episodic  
  
  
  
                                          
   
                                          
   
                                          
   
                                          
   
                                          
   
                                          
   
                                          
   
                                          
  
  
  
Results  
  
  
                      Test Name  Value      Interpretation Reference Range Facil  
ity  
  
  
  
                                          
   
                                          
   
                                          
   
                                          
   
                                          
   
                                          
   
                                          
   
                                          
   
                                          
   
                                          
   
                                          
   
                                          
   
                                          
   
                                          
   
                                          
   
                                          
   
                                          
   
                                          
   
                                          
   
                                          
   
                                          
   
                                          
   
                                          
   
                                          
   
                                          
   
                                          
   
                                          
   
                                          
   
                                          
   
                                          
   
                                          
   
                                          
   
                                          
   
                                          
   
                                          
   
                                          
   
                                          
   
                                          
   
                                          
   
                                          
   
                                          
   
                                          
   
                                          
   
                                          
   
                                          
   
                                          
   
                                          
   
                                          
   
                                          
   
                                          
   
                                          
   
                                          
   
                                          
   
                                          
   
                                          
   
                                          
   
                                          
   
                                          
   
                                          
   
                                          
   
                                          
   
                                          
   
                                          
   
                                          
   
                                          
   
                                          
   
                                          
   
                                          
   
                                          
   
                                          
   
                                          
   
                                          
   
                                          
   
                                          
   
                                          
   
                                          
   
                                          
   
                                          
   
                                          
   
                                          
   
                                          
   
                                          
   
                                          
   
                                          
   
                                          
   
                                          
   
                                          
   
                                          
   
                                          
   
                                          
   
                                          
   
                                          
   
                                          
   
                                          
   
                                          
   
                                          
   
                                          
   
                                          
   
                                          
   
                                          
   
                                          
   
                                          
   
                                          
   
                                          
   
                                          
   
                                          
   
                                          
   
                                          
   
                                          
   
                                          
   
                                          
   
                                          
   
                                          
   
                                          
   
                                          
   
                                          
   
                                          
   
                                          
   
                                          
   
                                          
   
                                          
   
                                          
   
                                          
   
                                          
   
                                          
   
                                          
   
                                          
   
                                          
   
                                          
   
                                          
   
                                          
   
                                          
   
                                          
   
                                          
   
                                          
   
                                          
   
                                          
   
                                          
   
                                          
   
                                          
   
                                          
   
                                          
   
                                          
   
                                          
   
                                          
   
                                          
   
                                          
   
                                          
   
                                          
   
                                          
   
                                          
   
                                          
   
                                          
   
                                          
   
                                          
   
                                          
   
                                          
   
                                          
   
                                          
   
                                          
   
                                          
   
                                          
   
                                          
   
                                          
   
                                          
   
                                          
   
                                          
   
                                          
   
                                          
   
                                          
   
                                          
   
                                          
   
                                          
   
                                          
   
                                          
   
                                          
   
                                          
   
                                          
   
                                          
   
                                          
   
                                          
   
                                          
   
                                          
   
                                          
   
                                          
   
                                          
   
                                          
   
                                          
   
                                          
   
                                          
   
                                          
   
                                          
   
                                          
   
                                          
   
                                          
   
                                          
   
                                          
   
                                          
   
                                          
   
                                          
   
                                          
   
                                          
   
                                          
   
                                          
   
                                          
   
                                          
   
                                          
   
                                          
   
                                          
   
                                          
   
                                          
   
                                          
   
                                          
   
                                          
   
                                          
   
                                          
   
                                          
   
                                          
   
                                          
   
                                          
   
                                          
   
                                          
   
                                          
   
                                          
   
                                          
   
                                          
   
                                          
   
                                          
   
                                          
   
                                          
   
                                          
   
                                          
   
                                          
   
                                          
   
                                          
   
                                          
   
                                          
   
                                          
   
                                          
   
                                          
   
                                          
   
                                          
   
                                          
   
                                          
   
                                          
   
                                          
   
                                          
   
                                          
   
                                          
   
                                          
   
                                          
   
                                          
   
                                          
   
                                          
   
                                          
   
                                          
   
                                          
   
                                          
   
                                          
   
                                          
   
                                          
   
                                          
   
                                          
   
                                          
   
                                          
   
                                          
   
                                          
   
                                          
   
                                          
   
                                          
   
                                          
   
                                          
   
                                          
   
                                          
   
                                          
   
                                          
   
                                          
   
                                          
   
                                          
   
                                          
   
                                          
   
                                          
   
                                          
   
                                          
   
                                          
   
                                          
   
                                          
   
                                          
   
                                          
   
                                          
   
                                          
   
                                          
   
                                          
   
                                          
   
                                          
   
                                          
   
                                          
   
                                          
   
                                          
   
                                          
   
                                          
   
                                          
   
                                          
   
                                          
   
                                          
   
                                          
   
                                          
   
                                          
   
                                          
   
                                          
   
                                          
   
                                          
   
                                          
   
                                          
   
                                          
   
                                          
   
                                          
   
                                          
   
                                          
   
                                          
   
                                          
   
                                          
   
                                          
   
                                          
   
                                          
   
                                          
   
                                          
   
                                          
   
                                          
   
                                          
   
                                          
   
                                          
   
                                          
   
                                          
   
                                          
   
                                          
   
                                          
   
                                          
   
                                          
   
                                          
   
                                          
   
                                          
   
                                          
   
                                          
   
                                          
   
                                          
   
                                          
   
                                          
   
                                          
   
                                          
   
                                          
   
                                          
   
                                          
   
                                          
   
                                          
   
                                          
   
                                          
   
                                          
   
                                          
   
                                          
   
                                          
   
                                          
   
                                          
   
                                          
   
                                          
   
                                          
   
                                          
   
                                          
   
                                          
   
                                          
   
                                          
   
                                          
   
                                          
   
                                          
   
                                          
   
                                          
   
                                          
   
                                          
   
                                          
   
                                          
   
                                          
   
                                          
   
                                          
   
                                          
   
                                          
   
                                          
   
                                          
   
                                          
   
                                          
   
                                          
   
                                          
   
                                          
   
                                          
   
                                          
   
                                          
   
                                          
   
                                          
   
                                          
   
                                          
   
                                          
   
                                          
   
                                          
   
                                          
   
                                          
   
                                          
   
                                          
   
                                          
   
                                          
   
                                          
   
                                          
   
                                          
   
                                          
   
                                          
   
                                          
   
                                          
   
                                          
   
                                          
   
                                          
   
                                          
   
                                          
   
                                          
   
                                          
   
                                          
   
                                          
   
                                          
   
                                          
   
                                          
   
                                          
   
                                          
   
                                          
   
                                          
   
                                          
   
                                          
   
                                          
   
                                          
   
                                          
   
                                          
   
                                          
   
                                          
   
                                          
   
                                          
   
                                          
   
                                          
   
                                          
   
                                          
   
                                          
   
                                          
   
                                          
   
                                          
   
                                          
   
                                          
   
                                          
   
                                          
   
                                          
   
                                          
   
                                          
   
                                          
   
                                          
   
                                          
   
                                          
   
                                          
   
                                          
   
                                          
   
                                          
   
                                          
   
                                          
   
                                          
   
                                          
   
                                          
   
                                          
   
                                          
   
                                          
   
                                          
   
                                          
   
                                          
   
                                          
   
                                          
   
                                          
   
                                          
   
                                          
   
                                          
   
                                          
   
                                          
   
                                          
   
                                          
   
                                          
   
                                          
   
                                          
   
                                          
   
                                          
   
                                          
   
                                          
   
                                          
   
                                          
   
                                          
   
                                          
   
                                          
   
                                          
   
                                          
   
                                          
   
                                          
   
                                          
   
                                          
   
                                          
  
  
  
Vital Signs  
  
  
                          Date Time    Vital Sign   Value        Performing   
Clinician                               Facility  
   
                                                    2023   
09:          Body height         160 cm              Layo Colindres MD  
Work Phone:   
[phone]                          [facility]  
   
                                                    2023   
09:                              Body mass index (BMI)   
[Ratio]                   24.37 kg/m2               Layo Colindres MD  
Work Phone:   
[phone]                          [facility]  
   
                                                    2023   
09:          Body weight         62.41 kg            Layo Colindres MD  
Work Phone:   
[phone]                          [facility]  
   
                                                    2023   
09:                              Diastolic blood   
pressure                  80 mm[Hg]                 Layo Colindres MD  
Work Phone:   
[phone] [place] [address]  
   
                                                    2023   
09:          Heart rate          71 /min             Layo Colindres MD  
Work Phone:   
[phone]                          [facility]  
   
                                                    2023   
09:                              SaO2% (BldA) [Mass   
fraction]                 96 %                      Layo Colindres MD  
Work Phone:   
[phone]                          [facility]  
   
                                                    2023   
09:                              Systolic blood   
pressure                  140 mm[Hg]                Layo Colindres MD  
Work Phone:   
[phone] [place] [address]  
   
                                                    2023   
08:          Body height         160 cm              Aleena Wood   
APRN-CNP  
Work Phone:   
[phone] [place] [address]  
   
                                                    2023   
08:                              Body mass index (BMI)   
[Ratio]                   24.29 kg/m2               Aleena Wood   
APRN-CNP  
Work Phone:   
[phone] [place] [address]  
   
                                                    2023   
08:          Body weight         62.19 kg            Aleena Wood   
APRN-CNP  
Work Phone:   
[phone] [place] [address]  
   
                                                    2023   
08:                              Diastolic blood   
pressure                  70 mm[Hg]                 Aleena Wood   
APRN-CNP  
Work Phone:   
[phone] [place] [address]  
   
                                                    2023   
08:          Heart rate          72 /min             Aleena Wood   
APRN-CNP  
Work Phone:   
[phone] [place] [address]  
   
                                                    2023   
08:                              SaO2% (BldA) [Mass   
fraction]                 95 %                      Aleena Wood   
APRN-CNP  
Work Phone:   
[phone] [address] [place], [place] [address]  
   
                                                    2023   
08:                              Systolic blood   
pressure                  128 mm[Hg]                Aleena Wood   
APRN-CNP  
Work Phone:   
[phone]                          [facility]  
   
                                                    2023   
09:          Body height         160 cm              Aleena Wood   
APRN-CNP  
Work Phone:   
[phone]                          [facility]  
   
                                                    2023   
09:                              Body mass index (BMI)   
[Ratio]                   23.95 kg/m2               Aleena Wood   
APRN-CNP  
Work Phone:   
[phone]                          [facility]  
   
                                                    2023   
09:          Body weight         61.33 kg            Aleena Wood   
APRN-CNP  
Work Phone:   
[phone]                          [facility]  
   
                                                    2023   
09:                              Diastolic blood   
pressure                  74 mm[Hg]                 Aleena Wood   
APRN-CNP  
Work Phone:   
[phone]                          [facility]  
   
                                                    2023   
09:          Heart rate          67 /min             Aleena Wood   
APRN-CNP  
Work Phone:   
[phone]                          [facility]  
   
                                                    2023   
09:                              SaO2% (BldA) [Mass   
fraction]                 96 %                      Aleena Wood   
APRN-CNP  
Work Phone:   
[phone]                          [facility]  
   
                                                    2023   
09:                              Systolic blood   
pressure                  136 mm[Hg]                Aleena Wood   
APRN-CNP  
Work Phone:   
[phone]                          [facility]  
   
                                                    2023   
10:          Body height         160 cm              Layo Colindres MD  
Work Phone:   
[phone]                          [facility]  
   
                                                    2023   
10:                              Body mass index (BMI)   
[Ratio]                   24 kg/m2                  Layo Colindres MD  
Work Phone:   
[phone]                          [facility]  
   
                                                    2023   
10:          Body weight         61.46 kg            Layo Colindres MD  
Work Phone:   
[phone]                          [facility]  
   
                                                    2023   
10:                              Diastolic blood   
pressure                  58 mm[Hg]                 Layo Colindres MD  
Work Phone:   
[phone]                          [facility]  
   
                                                    2023   
10:          Heart rate          76 /min             Layo Colindres MD  
Work Phone:   
[phone]                          [facility]  
   
                                                    2023   
10:                              SaO2% (BldA) [Mass   
fraction]                 96 %                      Layo Colindres MD  
Work Phone:   
[phone]                          [facility]  
   
                                                    2023   
10:                              Systolic blood   
pressure                  110 mm[Hg]                Layo Colindres MD  
Work Phone:   
[phone]                          [facility]  
   
                                                    2023   
09:          Body height         160 cm              Layo Colindres MD  
Work Phone:   
[phone]                          [facility]  
   
                                                    2023   
09:                              Body mass index (BMI)   
[Ratio]                   23.99 kg/m2               Layo Colindres MD  
Work Phone:   
[phone]                          [facility]  
   
                                                    2023   
09:          Body weight         61.42 kg            Layo Colindres MD  
Work Phone:   
[phone]                          [facility]  
   
                                                    2023   
09:                              Diastolic blood   
pressure                  80 mm[Hg]                 Layo Colindres MD  
Work Phone:   
[phone]                          [facility]  
   
                                                    2023   
09:          Heart rate          71 /min             Layo Colindres MD  
Work Phone:   
[phone]                          [facility]  
   
                                                    2023   
09:                              SaO2% (BldA) [Mass   
fraction]                 93 %                      Layo Colindres MD  
Work Phone:   
[phone]                          [facility]  
   
                                                    2023   
09:                              Systolic blood   
pressure                  110 mm[Hg]                Layo Colindres MD  
Work Phone:   
[phone]                          [facility]  
   
                                                    11-   
14:          Body height         160.02 cm           Layo Colindres  
Work Phone:   
[phone]                          [facility]   
[facility]  
Work Phone:   
[phone]  
   
                                                    11-   
14:                              Body mass index (BMI)   
[Ratio]                   24.46 kg/m2               Layo Colindres  
Work Phone:   
[phone]                          [facility]   
[facility]  
Work Phone:   
[phone]  
   
                                                    11-   
14:                              Body surface area   
Derived from formula      1.65 m2                   Layo Colindres  
Work Phone:   
[phone]                          [facility]   
[facility]  
Work Phone:   
[phone]  
   
                                                    11-   
14:          Body weight         62.62 kg            Layo Colindres  
Work Phone:   
[phone]                          [facility]   
[facility]   
[facility] [facility]  
Work Phone:   
[phone]  
   
                                                    11-   
14:                              Diastolic blood   
pressure                  62 mm[Hg]                 Layo Masseycel  
Work Phone:   
[phone]                          [employer]   
[facility]  
Work Phone:   
[phone]  
   
                                                    11-   
14:          Heart rate          77 /min             Layo Colindres  
Work Phone:   
[phone]                          -Medical   
[facility]  
Work Phone:   
[phone]  
   
                                                    11-   
14:                              SaO2% (BldA) [Mass   
fraction]                 96 %                      Layo Colindres  
Work Phone:   
[phone]                          [employer]   
[facility]  
Work Phone:   
[phone]  
   
                                                    11-   
14:                              Systolic blood   
pressure                  110 mm[Hg]                Layo Colindres  
Work Phone:   
[phone]                          [facility]  
Work Phone:   
[phone]  
   
                                                    2022   
11:          Body height         160.02 cm           Layo Colindres  
Work Phone:   
[phone]                          [facility]   
[facility]   
[facility]  
Work Phone:   
[phone]  
   
                                                    2022   
11:                              Body mass index (BMI)   
[Ratio]                   24.14 kg/m2               Layo Masseycel  
Work Phone:   
[phone]                          [facility]  
Work Phone:   
[phone]  
   
                                                    2022   
11:                              Body surface area   
Derived from formula      1.64 m2                   Layo Masseycel  
Work Phone:   
[phone]                          [facility]   
[facility]   
[facility]  
Work Phone:   
[phone]  
   
                                                    2022   
11:          Body weight         61.8 kg             Layo Colindres  
Work Phone:   
[phone]                          [facility]   
[facility]   
[facility]  
Work Phone:   
[phone]  
   
                                                    2022   
11:                              Diastolic blood   
pressure                  66 mm[Hg]                 Layo Colindres  
Work Phone:   
[phone]                          [facility]   
[facility]   
[facility]  
Work Phone:   
[phone]  
   
                                                    2022   
11:          Heart rate          81 /min             Layo Colindres  
Work Phone:   
[phone]                          [facility]   
[facility]   
[facility]  
Work Phone:   
[phone]  
   
                                                    2022   
11:                              SaO2% (BldA) [Mass   
fraction]                 97 %                      Layo Colindres  
Work Phone:   
[phone]                          [facility]   
[facility]   
[facility]  
Work Phone:   
[phone]  
   
                                                    2022   
11:                              Systolic blood   
pressure                  120 mm[Hg]                Layo Colindres  
Work Phone:   
[phone]                          [facility]   
[facility]   
[facility]  
Work Phone:   
[phone]  
   
                                                    2022   
08:          Body height         160.02 cm           Layo Colindres  
Work Phone:   
[phone]                          [facility]   
[facility]   
[facility]  
Work Phone:   
[phone]  
   
                                                    2022   
08:                              Body mass index (BMI)   
[Ratio]                   24.36 kg/m2               Layo Colindres  
Work Phone:   
[phone]                          [facility]   
[facility]   
[facility]  
Work Phone:   
[phone]  
   
                                                    2022   
08:                              Body surface area   
Derived from formula      1.65 m2                   Layo Colindres  
Work Phone:   
[phone]                          [facility]   
[facility]   
[facility]  
Work Phone:   
[phone]  
   
                                                    2022   
08:          Body weight         62.37 kg            Layo Colindres  
Work Phone:   
[phone]                          [facility]   
[facility]   
[facility]  
Work Phone:   
[phone]  
   
                                                    2022   
08:                              Diastolic blood   
pressure                  68 mm[Hg]                 Layo Colinders  
Work Phone:   
[phone]                          [facility]   
[facility]   
[facility]  
Work Phone:   
[phone]  
   
                                                    2022   
08:          Heart rate          71 /min             Layo Colindres  
Work Phone:   
[phone]                          [facility]   
[facility]   
[facility]  
Work Phone:   
[phone]  
   
                                                    2022   
08:                              SaO2% (BldA) [Mass   
fraction]                 96 %                      Layo Colindres  
Work Phone:   
[phone]                          [facility]   
[facility]   
[facility]  
Work Phone:   
[phone]  
   
                                                    2022   
08:                              Systolic blood   
pressure                  116 mm[Hg]                Layo Colindres  
Work Phone:   
[phone]                          [facility]   
[facility]   
[facility]  
Work Phone:   
[phone]  
   
                                                    2021   
14:          Body height         160.02 cm           Layo Colindres  
Work Phone:   
[phone]                          [facility]   
[facility]   
[facility]  
Work Phone:   
[phone]  
   
                                                    2021   
14:                              Body mass index (BMI)   
[Ratio]                   24.83 kg/m2               Layo Colindres  
Work Phone:   
[phone]                          [facility]   
[facility]  
Work Phone:   
[phone]  
   
                                                    2021   
14:                              Body surface area   
Derived from formula      1.66 m2                   Layo Colindres  
Work Phone:   
[phone]                          [facility]   
[facility]   
[facility]  
Work Phone:   
[phone]  
   
                                                    2021   
14:          Body temperature    97.5 [degF]         Layo Colindres  
Work Phone:   
[phone]                          [facility]   
[facility]   
[facility]  
Work Phone:   
[phone]  
   
                                                    2021   
14:          Body weight         63.59 kg            Layo Colindres  
Work Phone:   
[phone]                          [facility]   
[facility]   
[facility]  
Work Phone:   
[phone]  
   
                                                    2021   
14:                              Diastolic blood   
pressure                  58 mm[Hg]                 Layo Colindres  
Work Phone:   
[phone]                          [facility]   
[facility]   
[facility]  
Work Phone:   
[phone]  
   
                                                    2021   
14:          Heart rate          82 /min             Layo Colindres  
Work Phone:   
[phone]                          [facility]   
[facility]   
[facility]  
Work Phone:   
[phone]  
   
                                                    2021   
14:                              SaO2% (BldA) [Mass   
fraction]                 96 %                      Layo Colindres  
Work Phone:   
[phone]                          [facility]   
[facility]   
[facility]  
Work Phone:   
[phone]  
   
                                                    2021   
14:                              Systolic blood   
pressure                  120 mm[Hg]                Layo Colindres  
Work Phone:   
[phone]                          [facility]   
[facility]   
[facility]  
Work Phone:   
[phone]  
   
                                                    10-   
08:          Body height         160.02 cm           Layo Colindres  
Work Phone:   
[phone]                          [facility]   
[facility]   
[facility]  
Work Phone:   
[phone]  
   
                                                    10-   
08:                              Body mass index (BMI)   
[Ratio]                   23.65 kg/m2               Layo Colindres  
Work Phone:   
[phone]                          [facility]   
[facility]   
[facility]  
Work Phone:   
[phone]  
   
                                                    10-   
08:                              Body surface area   
Derived from formula      1.63 m2                   Layo Colindres  
Work Phone:   
[phone]                          [facility]   
[facility]   
[facility]  
Work Phone:   
[phone]  
   
                                                    10-   
08:          Body temperature    97.5 [degF]         Layo Colindres  
Work Phone:   
[phone]                          [facility]   
[facility]   
[facility]  
Work Phone:   
[phone]  
   
                                                    10-   
08:          Body weight         60.56 kg            Layo Colindres  
Work Phone:   
[phone]                          [facility]   
[facility]  
Work Phone:   
[phone]  
   
                                                    10-   
08:                              Diastolic blood   
pressure                  68 mm[Hg]                 Layo Colindres  
Work Phone:   
[phone]                          -Medical   
[facility]  
Work Phone:   
[phone]  
   
                                                    10-   
08:          Heart rate          84 /min             Layo Colindres  
Work Phone:   
[phone]                          -Medical   
[facility]  
Work Phone:   
[phone]  
   
                                                    10-   
08:                              SaO2% (BldA) [Mass   
fraction]                 96 %                      Layo Colindres  
Work Phone:   
[phone]                          -Medical   
[facility]  
Work Phone:   
[phone]  
   
                                                    10-   
08:                              Systolic blood   
pressure                  104 mm[Hg]                Layo NUNO Brysonjacky  
Work Phone:   
[phone]                          -Medical   
[facility]  
Work Phone:   
[phone]  
  
  
  
Encounters  
  
  
                          Encounter Date Encounter Type Care Provider Facility  
   
                                                    Start: 2023  
End: 2023     ambulatory          Mer[facility] YAAKOV [facility]  
   
Ambulatory  
   
                                                    Start: 2023  
End: 2023                         Encounter for general   
adult medical   
examination without   
abnormal findings         [facility]   
Ambulatory  
   
                                                    Start: 2023  
End: 2023                         Assay of hemosiderin,   
quant                                   Layo Colindres MD  
Work Phone:   
[phone]                          [facility]  
Work Phone:   
[phone]  
   
                                                    Start: 2023  
End: 2023                         Patient encounter   
procedure                               Layo Colindres MD  
Work Phone:   
[phone]                           [facility]  
  
  
  
                                          
   
                                          
   
                                          
   
                                          
   
                                          
   
                                          
   
                                          
   
                                          
   
                                          
   
                                          
   
                                          
   
                                          
   
                                          
   
                                          
   
                                          
   
                                          
   
                                          
   
                                          
   
                                          
   
                                          
   
                                          
   
                                          
   
                                          
   
                                          
   
                                          
   
                                          
   
                                          
   
                                          
   
                                          
   
                                          
   
                                          
   
                                          
   
                                          
   
                                          
   
                                          
   
                                          
   
                                          
   
                                          
   
                                          
   
                                          
   
                                          
   
                                          
   
                                          
   
                                          
   
                                          
   
                                          
   
                                          
  
  
  
Procedures  
  
  
                          Date         Procedure    Procedure Detail Performing   
Clinician  
   
                                                    Start:   
2023                              FOLLOW UP IN FAMILY   
MEDICINE                                            ALEENA DUNHAM  
   
                                                    Start:   
2023                              BI MAMMO BILATERAL   
SCREENING TOMOSYNTHESIS                             ALEENA DUNHAM  
   
                                                    Start:   
2023          Mammography                             Layo Colindres MD  
Work Phone:   
[phone]  
   
                                                    Start:   
2023                              CBC panel - Blood by   
Automated count                                     LAYO COLINDRES  
   
                                                    Start:   
2023                              Comprehensive metabolic   
2000 panel - Serum or   
Plasma                                              LAYO COLINDRES  
   
                                                    Start:   
2023          Lipid panel                             LAYO COLINDRES  
   
                                                    Start:   
2023                              Lipid 1996 panel - Serum   
or Plasma                                           Layo Colindres MD  
Work Phone:   
[phone]  
   
                                                    Start:   
2023                              FOLLOW UP IN FAMILY   
MEDICINE                                            ALEENA DUNHAM  
   
                                                    Start:   
2023                              POCT UA AUTOMATED MANUALLY   
RESULTED                                            ALEENA DUNHAM  
   
                                                    Start:   
2023                              Urnls dip stick/tablet   
rgnt auto w/o microscopy                            Layo Colindres MD  
Work Phone:   
[phone]  
   
                                                    Start:   
2023          POCT UA (NONAUTOMATED)                     ALEENA DUNHAM  
   
                                                    Start:   
2023                              Urnls dip stick/tablet   
rgnt non-auto w/o micrscp                           Layo Colindres MD  
Work Phone:   
[phone]  
   
                                                    Start:   
2022          Mammography                             Layo Colindres MD  
Work Phone:   
[phone]  
   
                                                    Start:   
10-                              Lipid 1996 panel - Serum   
or Plasma                                           Layo Colindres MD  
Work Phone:   
[phone]  
   
                                                    Start:   
04-                              History of laser assisted   
in situ keratomileusis                  S/P LASIK (laser   
assisted in situ   
keratomileusis)                         Ada Mercado II, OD  
Work Phone:   
[phone]  
   
                                                    Start:   
2013          Colonoscopy                             Layo Colindres  
Work Phone:   
[phone]  
   
                                       Appendectomy              Layo Colindres  
   
                                       Colonoscopy               Layo Colindres  
   
                                       Dilation and curettage              [facility]  
el Bernadine  
   
                                                            Tonsillectomy and   
adenoidectomy                                       Layo Colindres  
  
  
  
Plan of Treatment  
  
  
                          Date         Care Activity Detail       Author  
   
                          Start: 2028 Lipid panel  Lipid Panel  [facility]  
   
                                        Start: 10-   Lipid 1996 panel -   
Serum or Plasma           Lipid Screening           [facility]  
   
                          Start: 10- Lipid panel  Lipid Panel  [facility]  
   
                                        Start: 10-   OCT OPTIC NERVE CIRR  
US   
OU (BOTH EYES)                          OCT OPTIC NERVE CIRRUS   
OU (BOTH EYES) OPHT   
Imaging Routine   
Primary open angle   
glaucoma of both eyes,   
mild stage Expected:   
10/14/2025                              [facility]  
Work Phone:   
[phone]  
  
  
  
                                          
   
                                          
   
                                          
   
                                          
   
                                          
   
                                          
   
                                          
   
                                          
   
                                          
   
                                          
   
                                          
   
                                          
   
                                          
   
                                          
   
                                          
   
                                          
   
                                          
   
                                          
   
                                          
   
                                          
   
                                          
   
                                          
   
                                          
   
                                          
   
                                          
   
                                          
   
                                          
   
                                          
   
                                          
   
                                          
   
                                          
   
                                          
   
                                          
   
                                          
   
                                          
   
                                          
   
                                          
   
                                          
   
                                          
   
                                          
   
                                          
   
                                          
   
                                          
   
                                          
   
                                          
   
                                          
   
                                          
   
                                          
   
                                          
   
                                          
   
                                          
   
                                          
   
                                          
   
                                          
   
                                          
   
                                          
   
                                          
   
                                          
   
                                          
   
                                          
   
                                          
   
                                          
   
                                          
   
                                          
   
                                          
   
                                          
   
                                          
   
                                          
  
  
  
Immunizations  
  
  
                      Immunization Date Immunization Notes      Care Provider Fa  
cally  
   
                                        2022          Influenza, High-dose  
   
Seasonal, Quadrivalent,   
Preservative Free                                   Layo Colindres MD  
Work Phone:   
[phone]                          [facility]  
   
                                        2022          influenza virus   
vaccine, unspecified   
formulation                                         Aleena Dunham APRNAMENA  
Work Phone:   
[phone]                          [facility]  
Work Phone:   
[phone]  
   
                                        10-          Moderna COVID-19   
Vaccine 100 MCG/0.5ML   
Intramuscular   
Suspension                                          Layo Colindres  
Work Phone:   
[phone]                          [facility]  
   
                                        10-          Fluad Quadrivalent 0  
.5   
ML Intramuscular   
Prefilled Syringe                                   Layo Colindres  
Work Phone:   
[phone]                          [facility]   
[facility]  
Work Phone:   
[phone]  
   
                                        10-          influenza, injectabl  
e,   
quadrivalent, contains   
preservative                                        Layo Colindres MD  
Work Phone:   
[phone]                          [facility]  
Work Phone:   
[phone]  
   
                                        2021          Moderna COVID-19   
Vaccine 100 MCG/0.5ML   
Intramuscular   
Suspension                                          Layo Colindres  
Work Phone:   
[phone]                          [facility]  
   
                                        2021          Moderna COVID-19   
Vaccine 100 MCG/0.5ML   
Intramuscular   
Suspension                                          Layo Colindres  
Work Phone:   
[phone]                          [facility]  
  
  
  
                                          
   
                                          
   
                                          
   
                                          
   
                                          
   
                                          
   
                                          
   
                                          
   
                                          
   
                                          
   
                                          
   
                                          
  
  
  
Payers  
  
  
                          Date         Payer Category Payer        Policy ID  
   
                                2022      Medicare        AETNA MEDICARE A  
ETNA   
MEDICARE PPO   
[id number]   
2022-Present   
[phone]  [address]   
[id number] [place]   
[phone] PPO                          [id number]   
[id number]  
[id number]  
   
                          2022   Medicare                  [id number]  
[id number]  
[id number]  
   
                          2022   Private Health Insurance              101  
[id number]  
   
                          2021   Medicare                  [id number]  
   
                          1950   Unknown                   [id number]   
[id number]  
[id number]  
   
                          1950   Unknown                   [id number]   
[id number]  
[id number]  
   
                          1950   Unknown                   [id number]   
[id number]  
[id number]  
   
                          1950   Unknown                   [id number]   
[id number]  
[id number]  
   
                          1950   Unknown                   [id number]   
[id number]  
[id number]  
   
                          1950   Unknown                   [id number]   
[id number]  
[id number]  
   
                          1950   Unknown                   [id number]   
[id number]  
[id number]  
   
                          1950   Unknown                   [id number]   
[id number]  
[id number]  
   
                          1950   Unknown                   [id number]   
[id number]  
[id number]  
   
                          1950   Unknown                   [id number]   
[id number]  
[id number]  
   
                          1950   Unknown                   [id number]   
[id number]  
[id number]  
   
                          1950   Unknown                   [id number]   
[id number]  
[id number]  
   
                          1950   Unknown                   [id number]   
[id number]  
[id number]  
   
                                       Unknown                     
  
  
  
Social History  
  
  
                          Date         Type         Detail       Facility  
   
                                                Assertion       Tobacco smoking   
consumption unknown   
(finding)                               [facility]   
[facility]  
Work Phone:   
[phone]  
   
                                                    Start:   
2023  
End: 2023                         Patient consumes   
caffeinated coffee                      Patient consumes   
caffeinated coffee                      [facility]  
   
                                                    Start:   
04-  
End: 2023                         Tobacco smoking status   
NHIS                      Never smoked tobacco      [facility]  
   
                                                    Start:   
04-  
End: 2023                         Tobacco use and   
exposure                                Smokeless tobacco   
non-user                                [facility]  
   
                                                    Start:   
2022  
End: 2023           Alcohol intake            Current drinker of   
alcohol (finding)                       [facility]  
   
                                                    Start:   
04-                              History SDOH Alcohol   
Comment                   Occassionally             [facility]  
   
                                                    Start:   
1950          Sex Assigned At Birth Not on file         [facility]  
   
                                                    Start:   
2022  
End: 2023                         Exposure to SARS-CoV-2   
(event)                   Not sure                  [facility]  
   
                                                    Start:   
2023  
End: 2023     Tobacco use panel                       [facility]  
   
                                                            National Score (1-10  
0),   
lower number is lower   
risk                      71                        [facility]  
  
  
  
Functional Status  
  
  
                          Date         Assessment   Result       Facility  
   
                                                    NEGATED: Highlighted   
row                       Functional performance    Functional status   
health issues are not   
documented Disease                      [facility]  
Work Phone:   
[phone]  
  
  
  
Mental Status  
  
  
                          Date         Assessment   Result       Facility  
   
                                                    NEGATED: Highlighted   
row                                     Cognitive function   
[Interpretation]                        Cognitive status   
health issues are not   
documented Disease                      [facility]   
[facility]  
Work Phone:   
[phone]  
  
  
  
Clinical Notes 2022 to 2023  
     Layo Colindres MD - 2023 10:00 AM Roverto Colindres MD -   
2023 10:00 AM ESTPatient InstructionsCooperAda sosa II, OD - 
10/25/2023 10:46 AM EDTPatient Instructions  
  
                                Note Date & Type Note            Facility  
   
                                                    2023 History of   
Present illness Narrative               Formatting of this note is   
different from the original.  
Subjective  
Reason for Visit: Ella Charles is an 73 y.o. female   
here for a Medicare Wellness   
visit.  
  
Past Medical, Surgical, and   
Family History reviewed and   
updated in chart.  
  
Reviewed all medications by   
prescribing practitioner or   
clinical pharmacist (such as   
prescriptions, OTCs, herbal   
therapies and supplements) and   
documented in the medical record.  
  
HPI  
  
Patient Care Team:  
Layo Colindres MD as PCP -   
General  
Layo Colindres MD as PCP -   
[employer] PCP  
  
Review of Systems  
  
Objective  
Vitals:  
/80   Pulse 71   Ht 1.6 m   
(5' 3 )   Wt 62.4 kg (137 lb 9.6   
oz)   SpO2 96%   BMI 24.37 kg/m  
  
Physical Exam  
  
Assessment/Plan  
Problem List Items Addressed This   
Visit  
Benign hypertension  
Hyperlipidemia  
Increased frequency of urination  
DAVID (stress urinary incontinence,   
female)  
Other Visit Diagnoses  
Routine general medical   
examination at health care   
facility - Primary  
  
  
  
Electronically signed by Layo Colindres MD at 2023 1:13   
PM EST  
Formatting of this note is   
different from the original.  
Subjective  
Patient ID: Ella Charles is   
a 73 y.o. female who presents for   
Medicare Annual Wellness Visit   
Subsequent.  
  
HPI  
Since the last office visit there   
have been no interval operations,   
hospitalizations, important   
illnesses or injuries.  
HTN-Takes and tolerates meds   
without side effects. No alcohol.   
no tobacco. no exercise. low   
salt. Reviewed recommendation for   
150 minutes of exercise per week   
including 2 days of weight   
training if over age 50  
Had reduced amlodiine to 5 mgand   
bp isavg <130/<75  
Hyperlipidemia- is on a statin   
and a prudent diet.  
  
Review of Systems  
General-no fatigue weight to   
within 10 pounds  
ENT no problems with vision   
swallowing  
Cardiac no chest pains   
palpitations change in exercise   
tolerance or capacity  
Pulmonary no cough shortness of   
breath  
GI no heartburn or abdominal pain  
Musculoskeletal no joint pains  
Objective  
/80   Pulse 71   Ht 1.6 m   
(5' 3 )   Wt 62.4 kg (137 lb 9.6   
oz)   SpO2 96%   BMI 24.37 kg/m  
  
Physical Exam  
General: Alert, No acute   
distress. Appears stated age  
Eye: Pupils are equal, round and   
reactive to light, Extraocular   
movements are intact, Normal   
conjunctiva.  
Neck: Supple, Non-tender, No   
carotid bruit, No jugular venous   
distention, No lymphadenopathy,   
No thyromegaly.  
Respiratory: Lungs are clear to   
auscultation, Respirations are   
non-labored, Breath sounds are   
equal.  
Cardiovascular: Normal rate,   
Regular rhythm, No murmur.  
Gastrointestinal: Soft,   
Non-tender, No organomegaly. No   
solid or pulsatile mass  
Integumentary: Warm, Dry. No   
concerning lesions on exposed   
areas  
Neurologic: Alert, Oriented.   
Gross and fine motor intact, CN   
2-12 intact  
Psychiatric: Cooperative,   
Appropriate mood & affect.  
Assessment/Plan  
Problem List Items Addressed This   
Visit  
  
ICD-10-CM  
Benign hypertension I10  
Relevant Orders  
Follow Up In Primary Care  
Hyperlipidemia E78.5  
Relevant Medications  
atorvastatin (Lipitor) 40 mg   
tablet  
Other Relevant Orders  
Comprehensive Metabolic Panel  
Gamma GT  
Follow Up In Primary Care  
Increased frequency of urination   
R35.0  
DAVID (stress urinary incontinence,   
female) N39.3  
  
Other Visit Diagnoses  
  
Codes  
Routine general medical   
examination at health care   
facility - Primary Z00.00  
  
  
  
  
Electronically signed by Layo Colindres MD at 2023 1:13   
PM EST  
documented in this encounter            [facility]  
Work Phone:   
[phone]  
   
                                        10- Note     HNO ID: [id number]  
Author: Ada Mercado II, ENRRIQUE  
Service: ?  
Author Type: OPTOMETRIST  
Type: Progress Notes  
Filed: 10/25/2023 11:17 AM  
Note Text:  
Assessment and Plan  
H40.1131 Primary open angle   
glaucoma of both eyes, mild stage   
(primary  
encounter diagnosis)  
Comment: Intraocular pressures   
appear stable with current   
treatment.  
Advise patient as to the risks of   
blindness with glaucoma. Advise   
patient  
as to status of condition and   
necessity of close monitoring as   
directed.  
Continue use of Latanoprost   
0.005% 1 gt both eyes qhs.   
Recheck glaucoma  
control in 6 months at Dilated   
fundus exam with update of OCT   
NFA and  
Visual field.  
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   
cataract of both eyes  
Comment: Slow progression both   
eyes. Monitor.  
H02.403 Ptosis of both eyelids  
Comment: R>L into pupil zone.   
Patient tolerates for now.   
Monitor.  
I have confirmed and edited as   
necessary the relevant ophthalmic   
history,  
ROS, and the neuro exam findings   
as obtained by others. I have   
seen and  
examined Ella Charles.  
I have discussed the case and the   
management of this patient's care   
with  
the Resident/Fellow, if   
applicable. I also have reviewed   
and agree with  
the assessment and plan as stated   
above and agree with all of its   
relevant  
components.  
Ada Mercado II, OD               [facility]  
   
                                        10- Instructions   
  
  
Ada Mercado II, OD -   
10/25/2023 10:49 AM EDTFormatting   
of this note might be different   
from the original.  
Assessment and Plan  
  
H40.1131 Primary open angle   
glaucoma of both eyes, mild stage   
(primary encounter diagnosis)  
Comment: Intraocular pressures   
appear stable with current   
treatment. Advise patient as to   
the risks of blindness with   
glaucoma. Advise patient as to   
status of condition and necessity   
of close monitoring as directed.   
Continue use of Latanoprost   
0.005% 1 gt both eyes qhs.   
Recheck glaucoma control in 6   
months at Dilated fundus exam   
with update of OCT NFA and Visual   
field.  
  
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   
cataract of both eyes  
Comment: Slow progression both   
eyes. Monitor.  
  
H02.403 Ptosis of both eyelids  
Comment: R>L into pupil zone.   
Patient tolerates for now.   
Monitor.  
  
I have confirmed and edited as   
necessary the relevant ophthalmic   
history, ROS, and the neuro exam   
findings as obtained by others. I   
have seen and examined Ella Charles.  
I have discussed the case and the   
management of this patient's care   
with the Resident/Fellow, if   
applicable. I also have reviewed   
and agree with the assessment and   
plan as stated above and agree   
with all of its relevant   
components.  
  
Ada Mercado II, OD  
  
  
  
  
Electronically signed by   
Ada Mercado II, OD at   
10/25/2023 10:49 AM EDT  
  
documented in this encounter            [facility]  
   
                                                    10- History of   
Present illness Narrative               Formatting of this note might be   
different from the original.  
Assessment and Plan  
  
H40.1131 Primary open angle   
glaucoma of both eyes, mild stage   
(primary encounter diagnosis)  
Comment: Intraocular pressures   
appear stable with current   
treatment. Advise patient as to   
the risks of blindness with   
glaucoma. Advise patient as to   
status of condition and necessity   
of close monitoring as directed.   
Continue use of Latanoprost   
0.005% 1 gt both eyes qhs.   
Recheck glaucoma control in 6   
months at Dilated fundus exam   
with update of OCT NFA and Visual   
field.  
  
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   
cataract of both eyes  
Comment: Slow progression both   
eyes. Monitor.  
  
H02.403 Ptosis of both eyelids  
Comment: R>L into pupil zone.   
Patient tolerates for now.   
Monitor.  
  
I have confirmed and edited as   
necessary the relevant ophthalmic   
history, ROS, and the neuro exam   
findings as obtained by others. I   
have seen and examined Ella Charles.  
I have discussed the case and the   
management of this patient's care   
with the Resident/Fellow, if   
applicable. I also have reviewed   
and agree with the assessment and   
plan as stated above and agree   
with all of its relevant   
components.  
  
Ada Mercado II, OD  
  
  
Electronically signed by   
Ada Mercado II, OD at   
10/25/2023 11:17 AM EDT  
documented in this encounter            [facility]  
   
                                                    2023 History of   
Present illness Narrative               Formatting of this note is   
different from the original.  
Subjective  
Patient ID: Ella Charles is   
a 73 y.o. female who presents for   
Hypertension (Pt states blood   
pressure running higher than she   
would like ).  
  
Ella comes to the office for a   
1MO marcelina on HTN. States has a   
history of longstanding   
hypertension in which she took   
lisinopril for several years. She   
did develop a cough on that   
medication it was discontinued   
and changed to losartan. She   
stopped losartan back in  as   
she felt it caused brain   
fogginess and headaches. States   
that she can feel a pressure in   
her head when her systolic blood   
pressure goes above 160's.   
Previously had trial   
discontinuing losartan and   
restarting and symptoms returned.   
Endorses no chest pain/syncopal   
episodes/visual disturbances.   
Started on Amlodipine 5mg 1 MO   
ago. Community BP Readings:   
135-138/70-80's. Taking &   
tolerating Amlodipine.  
Physical activity: walking daily  
Schedule MMG after 23  
Keep OV w/ MDS in Nov.  
Depression screening completed   
today. PHQ-2 score: 0. Will   
re-evaluate annually and as   
needed  
  
  
  
Review of Systems  
HENT:  
+ head pressure w/ elevated BP   
readings  
Eyes: Negative for visual   
disturbance.  
Respiratory: Negative for   
shortness of breath.  
Cardiovascular: Negative for   
chest pain, palpitations and leg   
swelling.  
Gastrointestinal: Negative for   
constipation.  
Neurological: Negative for   
dizziness, weakness,   
light-headedness and headaches.  
Psychiatric/Behavioral:  
+ recent increased stress in life  
  
Objective  
/70   Pulse 72   Ht 1.6 m   
(5' 3 )   Wt 62.2 kg (137 lb 1.6   
oz)   SpO2 95%   BMI 24.29 kg/m  
  
Physical Exam  
Vitals and nursing note reviewed.  
Constitutional:  
Appearance: Normal appearance.  
HENT:  
Head: Normocephalic.  
Neck:  
Vascular: No carotid bruit.  
Cardiovascular:  
Rate and Rhythm: Normal rate and   
regular rhythm.  
Heart sounds: Normal heart   
sounds.  
Pulmonary:  
Effort: Pulmonary effort is   
normal.  
Breath sounds: Normal breath   
sounds.  
Musculoskeletal:  
Cervical back: Normal range of   
motion.  
Right lower leg: No edema.  
Left lower leg: No edema.  
Skin:  
General: Skin is warm and dry.  
Neurological:  
General: No focal deficit   
present.  
Mental Status: She is alert and   
oriented to person, place, and   
time.  
Psychiatric:  
Mood and Affect: Mood normal.  
Thought Content: Thought content   
normal.  
  
Assessment/Plan  
Problem List Items Addressed This   
Visit  
  
Benign hypertension  
Relevant Medications  
amLODIPine (Norvasc) 10 mg tablet  
Encounter for screening mammogram   
for malignant neoplasm of breast   
- Primary  
Relevant Orders  
BI mammo bilateral screening   
tomosynthesis  
  
  
Electronically signed by MANN Soto at 2023   
9:18 AM EDT  
documented in this encounter            [facility]  
Work Phone:   
[phone]  
   
                                        2023 Instructions   
  
  
MANN Soto -   
2023 8:40 AM EDTFormatting   
of this note might be different   
from the original.  
Increase amlodipine to 10 mg by   
mouth daily. Check blood   
pressures 2-3 times per week and   
record. Keep appointment with Dr. Colindres in November.  
Complete your mammography in   
December  
For any new medications that were   
prescribed today, the patient was   
educated about their indications   
for use, administration,   
frequency and potential side   
effects of the medication.  
Electronically signed by MANN Soto at 2023   
9:18 AM EDT  
  
documented in this encounter            [facility]  
Work Phone:   
[phone]  
   
                                                    2023 History of   
Present illness Narrative               Formatting of this note is   
different from the original.  
Subjective  
Patient ID: Ella Charles is   
a 73 y.o. female who presents for   
Hypertension (140's / 70-90's   
readings ).  
  
Ella comes to the office, with   
her , to discuss elevated   
blood pressures. States has a   
history of longstanding   
hypertension in which she took   
lisinopril for several years. She   
did develop a cough on that   
medication it was discontinued   
and changed to losartan. She   
stopped losartan back in  as   
she felt it caused brain   
fogginess and headaches. She has   
been checking her blood pressures   
at home and getting  
140's/70-90's. States that she   
can feel a pressure in her head   
when her systolic blood pressure   
goes above 160's. Previously had   
trial discontinuing losartan and   
restarting and symptoms returned.   
Endorses no chest pain/syncopal   
episodes/visual disturbances  
+ fxhx HTN  
Last labs from 2022  
  
  
  
Review of Systems  
Eyes: Negative for visual   
disturbance.  
Cardiovascular: Negative for   
chest pain, palpitations and leg   
swelling.  
Neurological: Positive for   
headaches. Negative for   
dizziness, syncope and   
light-headedness.  
  
Objective  
/74   Pulse 67   Ht 1.6 m   
(5' 3 )   Wt 61.3 kg (135 lb 3.2   
oz)   SpO2 96%   BMI 23.95 kg/m  
  
Physical Exam  
Vitals and nursing note reviewed.  
Constitutional:  
Appearance: Normal appearance.  
Neck:  
Thyroid: No thyromegaly.  
Cardiovascular:  
Rate and Rhythm: Normal rate and   
regular rhythm.  
Heart sounds: Normal heart   
sounds.  
Pulmonary:  
Effort: Pulmonary effort is   
normal.  
Breath sounds: Normal breath   
sounds.  
Musculoskeletal:  
Cervical back: Normal range of   
motion.  
Right lower leg: No edema.  
Left lower leg: No edema.  
Skin:  
General: Skin is warm and dry.  
Neurological:  
General: No focal deficit   
present.  
Mental Status: She is alert and   
oriented to person, place, and   
time.  
Psychiatric:  
Mood and Affect: Mood normal.  
Thought Content: Thought content   
normal.  
  
Assessment/Plan  
Problem List Items Addressed This   
Visit  
  
Benign hypertension - Primary  
Relevant Medications  
amLODIPine (Norvasc) 5 mg tablet  
Other Relevant Orders  
Follow Up In Primary Care -   
Established  
  
1. Benign hypertension Follow Up   
In Primary Care - Established  
amLODIPine (Norvasc) 5 mg tablet  
Start amlodipine 5 mg by mouth   
daily, check blood pressure 2-3   
times per week and record. Office   
visit in 4 to 6 weeks for recheck  
  
  
  
Electronically signed by MANN Soto at 2023   
10:08 AM EDT  
documented in this encounter            [facility]  
Work Phone:   
[phone]  
   
                                        2023 Instructions   
  
  
MANN Soto -   
2023 9:00 AM EDTFormatting   
of this note might be different   
from the original.  
Start amlodipine 5 mg by mouth   
daily  
Check blood pressure 2-3 times   
per week and record. Office visit   
in 4 to 6 weeks  
Discussed weight loss, reduction   
of sodium intake to less than   
2.4G daily and 30' physical   
activity most days of the week.   
Discussed DASH eating plan with   
avoidance of foods high in   
saturated/trans fats, limit   
sugar-sweetened beverages/foods,   
increase vegetable/fruit/whole   
grain consumption. Choose low-fat   
dairy products, fish, poultry,   
beans & nuts.  
Electronically signed by MANN Soto at 2023   
10:05 AM EDT  
  
documented in this encounter            [facility]  
Work Phone:   
[phone]  
   
                                                    2023 History of   
Present illness Narrative               Formatting of this note is   
different from the original.  
Subjective  
Patient ID: Ella Charles is   
a 72 y.o. female who presents for   
UTI and BP concerns.  
  
HPI  
Uti f,urge,d,backain, macrobid   
not helopful. Cipro has been   
effective  
  
HTN-home readings 135-150/ 60s.   
[place] form hydrochlorothiazide, prev   
cough on lisinopril, will try   
losartan  
  
Review of Systems general-no   
fatigue weight to within 10   
pounds  
ENT no problems with vision   
swallowing  
Cardiac no chest pains   
palpitations change in exercise   
tolerance or capacity  
Pulmonary no cough shortness of   
breath  
GI no heartburn or abdominal pain  
Musculoskeletal no joint pains  
  
Objective  
/58   Pulse 76   Ht 1.6 m   
(5' 3 )   Wt 61.5 kg (135 lb 8   
oz)   SpO2 96%   BMI 24.00 kg/m  
  
Physical Exam  
Heart regular without murmur.   
Lungs clear to auscultation. Neck   
no bruit thyroid nontender.  
Assessment/Plan  
Problem List Items Addressed This   
Visit  
  
  
Circulatory  
Benign hypertension - Primary  
Relevant Medications  
losartan (Cozaar) 25 mg tablet  
  
Other Visit Diagnoses  
  
Frequency of urination  
Relevant Medications  
ciprofloxacin (Cipro) 250 mg   
tablet  
Other Relevant Orders  
POCT UA Automated manually   
resulted (Completed)  
  
  
  
Call bp in a month  
\trial losartan 25  
Cipro for uti  
Electronically signed by Layo Colindres MD at 2023 1:56   
PM EDT  
documented in this encounter            [facility]  
Work Phone:   
[phone]  
   
                                                    2023 History of   
Present illness Narrative               Formatting of this note might be   
different from the original.  
Subjective  
Patient ID: Ella Charles is   
a 72 y.o. female who presents for   
6 mo fu.  
  
HPI  
Since the last office visit there   
have been no interval operations,   
hospitalizations, important   
illnesses or injuries.  
HTN-Takes and tolerates meds   
without side effects. No alcohol.   
no tobacco. no exercise. low   
salt. Reviewed recommendation for   
150 minutes of exercise per week   
including 2 days of weight   
training if over age 50  
Hyperlipidemia- is on a statin   
and a prudent diet.  
Still with urinary freq. Nocturia   
x1, usi  
Cough 6-8x a day will washout   
lisonprila nd use   
hydrochlorothiazide alone  
Review of Systems  
General-no fatigue weight to   
within 10 pounds  
ENT no problems with vision   
swallowing  
Cardiac no chest pains   
palpitations change in exercise   
tolerance or capacity  
Pulmonary no cough shortness of   
breath  
GI no heartburn or abdominal pain  
Musculoskeletal no joint pains  
  
Objective  
/80   Pulse 71   Ht 1.6 m   
(5' 3 )   Wt 61.4 kg (135 lb 6.4   
oz)   SpO2 93%   BMI 23.99 kg/m  
  
Physical Exam  
General: Alert, No acute   
distress. Appears stated age  
Eye: Pupils are equal, round and   
reactive to light, Extraocular   
movements are intact, Normal   
conjunctiva.  
Neck: Supple, Non-tender, No   
carotid bruit, No jugular venous   
distention, No lymphadenopathy,   
No thyromegaly.  
Respiratory: Lungs are clear to   
auscultation, Respirations are   
non-labored, Breath sounds are   
equal.  
Cardiovascular: Normal rate,   
Regular rhythm, No murmur.  
Gastrointestinal: Soft,   
Non-tender, No organomegaly. No   
solid or pulsatile mass  
Integumentary: Warm, Dry. No   
concerning lesions on exposed   
areas  
Neurologic: Alert, Oriented.   
Gross and fine motor intact, CN   
2-12 intact  
Psychiatric: Cooperative,   
Appropriate mood & affect.  
  
Assessment/Plan  
  
  
Electronically signed by Layo Colindres MD at 2023 4:37 PM   
EDT  
documented in this encounter            [facility]  
Work Phone:   
[phone]  
   
                                        2023 Note     HNO ID: [id number]  
Author: Ada Mercado II, OD  
Service: ?  
Author Type: OPTOMETRIST  
Type: Progress Notes  
Filed: 2023 10:28 AM  
Note Text:  
Assessment and Plan  
H40.1131 Primary open angle   
glaucoma of both eyes, mild stage   
(primary  
encounter diagnosis)  
Comment: Intraocular pressures   
appear stable with current   
treatment.  
Advise patient as to the risks of   
blindness with glaucoma. Advise   
patient  
as to status of condition and   
necessity of close monitoring as   
directed.  
Continue use of Latanoprost   
0.005% 1 gt both eyes qhs.   
Recheck glaucoma  
control in 6 months.  
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   
cataract of both eyes  
Comment: Mild cataract in both   
eyes. Well tolerated at this   
time.  
Monitor as instructed.  
I have confirmed and edited as   
necessary the relevant ophthalmic   
history,  
ROS, and the neuro exam findings   
as obtained by others. I have   
seen and  
examined Ella Charles.  
I have discussed the case and the   
management of this patient's care   
with  
the Resident/Fellow, if   
applicable. I also have reviewed   
and agree with  
the assessment and plan as stated   
above and agree with all of its   
relevant  
components.  
Ada Mercado II, ENRRIQUE               [facility]  
   
                                                    2023 Miscellaneous   
Notes                                   Addended by: ADA MERCADO II   
on: 2023 10:36 AM  
  
Modules accepted: Orders,   
SmartSet  
  
  
Electronically signed by Ada Mercado II, OD at 2023   
10:36 AM EDT  
documented in this encounter            [facility]  
   
                                        2023 Instructions   
  
  
Ada Mercado II, OD -   
2023 10:27 AM EDTFormatting   
of this note might be different   
from the original.  
Assessment and Plan  
  
H40.1131 Primary open angle   
glaucoma of both eyes, mild stage   
(primary encounter diagnosis)  
Comment: Intraocular pressures   
appear stable with current   
treatment. Advise patient as to   
the risks of blindness with   
glaucoma. Advise patient as to   
status of condition and necessity   
of close monitoring as directed.   
Continue use of Latanoprost   
0.005% 1 gt both eyes qhs.   
Recheck glaucoma control in 6   
months.  
  
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   
cataract of both eyes  
Comment: Mild cataract in both   
eyes. Well tolerated at this   
time. Monitor as instructed.  
  
I have confirmed and edited as   
necessary the relevant ophthalmic   
history, ROS, and the neuro exam   
findings as obtained by others. I   
have seen and examined Ella Charles.  
I have discussed the case and the   
management of this patient's care   
with the Resident/Fellow, if   
applicable. I also have reviewed   
and agree with the assessment and   
plan as stated above and agree   
with all of its relevant   
components.  
  
Ada Mercado II, OD  
  
  
  
  
Electronically signed by Ada Mercado II, OD at 2023   
10:27 AM EDT  
  
documented in this encounter            [facility]  
   
                                                    2023 History of   
Present illness Narrative               Formatting of this note might be   
different from the original.  
Assessment and Plan  
  
H40.1131 Primary open angle   
glaucoma of both eyes, mild stage   
(primary encounter diagnosis)  
Comment: Intraocular pressures   
appear stable with current   
treatment. Advise patient as to   
the risks of blindness with   
glaucoma. Advise patient as to   
status of condition and necessity   
of close monitoring as directed.   
Continue use of Latanoprost   
0.005% 1 gt both eyes qhs.   
Recheck glaucoma control in 6   
months.  
  
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   
cataract of both eyes  
Comment: Mild cataract in both   
eyes. Well tolerated at this   
time. Monitor as instructed.  
  
I have confirmed and edited as   
necessary the relevant ophthalmic   
history, ROS, and the neuro exam   
findings as obtained by others. I   
have seen and examined Ella Charles.  
I have discussed the case and the   
management of this patient's care   
with the Resident/Fellow, if   
applicable. I also have reviewed   
and agree with the assessment and   
plan as stated above and agree   
with all of its relevant   
components.  
  
Ada Mercado II, OD  
  
  
Electronically signed by Ada Mercado II OD at 2023   
10:28 AM EDT  
documented in this encounter            [facility]  
   
                                        2022 Note     HNO ID: [id number]  
Author: dAa Mercado II, OD  
Service: ?  
Author Type: OPTOMETRIST  
Type: Progress Notes  
Filed: 2022 5:34 PM  
Note Text:  
Assessment and Plan  
H40.1131 Primary open angle   
glaucoma of both eyes, mild stage   
(primary  
encounter diagnosis)  
Comment: Intraocular pressures   
appear stable with current   
treatment.  
Advise patient as to the risks of   
blindness with glaucoma. Advise   
patient  
as to status of condition and   
necessity of close monitoring as   
directed.  
Continue use of Latanoprost   
0.005% 1 gt both eyes qhs.   
Recheck glaucoma  
control in 4 months.  
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   
cataract of both eyes  
Comment: Mild cataract in both   
eyes. Well tolerated at this   
time.  
Discussed possible future affect   
on daily activities to watch for.  
Monitor as instructed.  
Z98.890 S/P LASIK (laser assisted   
in situ keratomileusis)  
Comment: Clear stable corneas.   
Monitor  
H52.223 Regular astigmatism of   
both eyes  
H52.4 Presbyopia  
H52.12 Myopia of left eye  
H52.01 Hypermetropia, right  
Comment: Stable glasses power.   
Update as desired.  
I have confirmed and edited as   
necessary the relevant ophthalmic   
history,  
ROS, and the neuro exam findings   
as obtained by others. I have   
seen and  
examined Ella Charles.  
I have discussed the case and the   
management of this patient's care   
with  
the Resident/Fellow, if   
applicable. I also have reviewed   
and agree with  
the assessment and plan as stated   
above and agree with all of its   
relevant  
components.  
Ada Mercado II, OD               [facility]  
   
                                                    2022 History of   
Present illness Narrative                 
  
  
Formatting of this note might be   
different from the original.  
Assessment and Plan  
  
H40.1131 Primary open angle   
glaucoma of both eyes, mild stage  
Comment: Intraocular pressures   
appear stable with current   
treatment. Advise patient as to   
the risks of blindness with   
glaucoma. Advise patient as to   
status of condition and necessity   
of close monitoring as directed.   
Continue use of Latanoprost   
0.005% 1 gt both eyes qhs.   
Recheck glaucoma control in 4   
months.  
  
I have confirmed and edited as   
necessary the relevant ophthalmic   
history, ROS, and the neuro exam   
findings as obtained by others. I   
have seen and examined Ella L   
Hugo.  
I have discussed the case and the   
management of this patient's care   
with the Resident/Fellow, if   
applicable. I also have reviewed   
and agree with the assessment and   
plan as stated above and agree   
with all of its relevant   
components.  
  
Ada Mercado II, OD  
  
  
  
Electronically signed by Ada Mercado II, OD at 2022   
11:11 AM EDTdocumented in this   
encounter                               [facility]  
   
                                        2022 Instructions   
  
  
Ada Mercado II, OD -   
2022 11:11 AM EDT  
  
  
Formatting of this note might be   
different from the original.  
Assessment and Plan  
  
H40.1131 Primary open angle   
glaucoma of both eyes, mild stage  
Comment: Intraocular pressures   
appear stable with current   
treatment. Advise patient as to   
the risks of blindness with   
glaucoma. Advise patient as to   
status of condition and necessity   
of close monitoring as directed.   
Continue use of Latanoprost   
0.005% 1 gt both eyes qhs.   
Recheck glaucoma control in 4   
months.  
  
I have confirmed and edited as   
necessary the relevant ophthalmic   
history, ROS, and the neuro exam   
findings as obtained by others. I   
have seen and examined Ella SATYA Charles.  
I have discussed the case and the   
management of this patient's care   
with the Resident/Fellow, if   
applicable. I also have reviewed   
and agree with the assessment and   
plan as stated above and agree   
with all of its relevant   
components.  
  
Ada Mercado II, OD  
  
  
  
  
  
  
Electronically signed by Ada Mercado II, OD at 2022   
11:11 AM EDT  
documented in this encounter            [facility]  
  
  
  
                                          
   
                                          
   
                                          
  
documented in this encounter  
[facility]   
  
                                                    Diagnosis  
   
                                                      
  
  
Primary open angle glaucoma of both eyes, mild stage- Primary  
   
                                                      
  
  
Combined form of senile cataract of both eyes  
  
documented in this encounter  
[facility]   
  
                                                    Diagnosis  
   
                                                      
  
  
DAVID (stress urinary incontinence, female)- Primary  
   
                                                      
  
  
Increased frequency of urination  
  
  
Urinary frequency  
   
                                                      
  
  
Benign hypertension  
  
  
Essential hypertension, benign  
   
                                                      
  
  
Mixed hyperlipidemia  
  
documented in this encounter  
[facility]  
Work Phone: [phone] note*   
  
                                                    Diagnosis  
   
                                                      
  
  
Benign hypertension- Primary  
  
  
Essential hypertension, benign  
   
                                                      
  
  
Frequency of urination  
  
  
Urinary frequency  
  
documented in this encounter  
[facility]  
Work Phone: [phone] note*   
  
                                                    Diagnosis  
   
                                                      
  
  
Benign hypertension- Primary  
  
  
Essential hypertension, benign  
  
documented in this encounter  
[facility]  
Work Phone: [phone] note*   
  
                                                    Diagnosis  
   
                                                      
  
  
Encounter for screening mammogram for malignant neoplasm of breast- Primary  
   
                                                      
  
  
Benign hypertension  
  
  
Essential hypertension, benign  
  
documented in this encounter  
[facility]  
Work Phone: [phone] note*   
  
                                                    Diagnosis  
   
                                                      
  
  
Primary open angle glaucoma of both eyes, mild stage- Primary  
   
                                                      
  
  
Combined form of senile cataract of both eyes  
   
                                                      
  
  
Ptosis of both eyelids  
  
  
Unspecified ptosis of eyelid  
  
documented in this encounter  
[facility]   
  
                                                    Diagnosis  
   
                                                      
  
  
Routine general medical examination at health care facility- Primary  
  
  
Routine general medical examination at a health care facility  
   
                                                      
  
  
Increased frequency of urination  
  
  
Urinary frequency  
   
                                                      
  
  
DAVID (stress urinary incontinence, female)  
   
                                                      
  
  
Benign hypertension  
  
  
Essential hypertension, benign  
   
                                                      
  
  
Mixed hyperlipidemia  
  
documented in this encounter  
[facility]  
Work Phone: [phone] of Present illness Narrative* The patient is 
  being seen for the subsequent annual wellness visit.  
* Past Medical, Surgical and Family History: reviewed and updated in chart.  
* Interval History: Patient has not been hospitalized previously.  
* Medications and Supplements: Medications and supplements, including calcium 
  and vitamins reviewed and updated in chart.  
* No, the patient is not using opioids.  
* Patient Self Assessment of Health Status: excellent.  
* Tobacco use: Non-User  
* Alcohol use: Non-User, As noted in social history  
* Illicit drug use: Non-User  
* Current diet: well balanced diet.  
* Exercise Frequency: regularly.  
* Depression/Suicide Screening: .  
* During the past 2 weeks, the patient has not felt down, depressed or hopeless.  
* During the past 2 weeks, the patient has not felt little interest or pleasure 
  in doing things.  
* Hearing Impairment: none.  
* Cognitive Impairment: No cognitive impairment observed.  
* Bathing: performs independently.  
* Dressing: performs independently.  
* Walking: performs independently.  
* Toileting: performs independently.  
* Feeding: performs independently.  
* Personal Hygiene: performs independently.  
* Bowels: continent.  
* Bladder: continent.  
* Managing Finances: performs independently.  
* Shopping: performs independently.  
* Managing Medications: performs independently.  
* Housework / Basic Home Maintenance: performs independently.  
* Handling Transportation: performs independently.  
* Preparing Meals: performs independently.  
* Using the Telephone/ Communication Devices: performs independently.  
* Falls Risk Screening:. ELLA has not fallen in the last 6 months.  
* Home safety risk factors: no grab bars in the bathroom.  
* Since the last office visit there have been no interval operations, 
  hospitalizations, important illnesses or injuries.  
* rba of hl rx rev agree to card calc score  
* Hyperlipidemia- is NOT on statin and has a prudent diet.  
* HTN-Takes and tolerates meds without side effects. No alcohol. no tobacco. 
  walks exercise. low salt. Reviewed recommendation for 150 minutes of exercise 
  per week including 2 days of weight training if over age 50  
* has urinary freq and tr bld and leuk on dip  
[facility]  
Work Phone: [phone] of Present illness Narrative* Since the last 
  office visit there have been no interval operations, hospitalizations, 
  important illnesses or injuries.  
* Hyperlipidemia- is on statin and a prudent diet.  
* HTN-Takes and tolerates meds without side effects. No alcohol. no tobacco. 
  some exercise. low salt.Reviewed recommendation for 150 minutes of exercise 
  per week including 2 days of weight training ifover age 50  
[facility]  
Work Phone: [phone] of Present illness Narrative* dysuria \{ + \},
   hematuria \{ - \}, frequency \{ + \}, nocturia \{ + \}, urgency \{ + \}, 
  urinary incontinence \{ + \}, hesitancy \{ - \}  
* Duration of Symptoms: \{ 3W \}  
* Previous UTI's \{ +; just completed course of antibiotic last MO \}  
* Relieving factors: none  
* Aggravating factors: none  
* Associated Symptoms: fever \{ - \}, chills \{ - \}, CVA tenderness \{ lower 
  back keesha. \}, constipation \{ - \}, abdominal pain \{ - \}  
* OTC supplements/remedies \{ cranberry \}  
* Caffeine consumption \{ + \}  
* IO UA: trace leuk, trace blood. glucose: neg.  
[facility]  
Work Phone: [phone] of Present illness Narrative* The patient is 
  being seen for the subsequent annual wellness visit.  
* Past Medical, Surgical and Family History: reviewed and updated in chart.  
* Interval History: Patient has not been hospitalized previously.  
* Medications and Supplements: Review of all medications by a prescribing 
  practitioner or clinical pharmacist (such as prescriptions, OTCs, herbal 
  therapies and supplements) documented in the medical record.  
* No, the patient is not using opioids.  
* Patient Self Assessment of Health Status: excellent.  
* Tobacco use: Non-User  
* Alcohol use: Non-User, As noted in social history  
* Illicit drug use: Non-User  
* Current diet: well balanced diet.  
* Exercise Frequency: regularly.  
* Depression/Suicide Screening: .  
* During the past 2 weeks, the patient has not felt down, depressed or hopeless.  
* During the past 2 weeks, the patient has not felt little interest or pleasure 
  in doing things.  
* Hearing Impairment: none.  
* Cognitive Impairment: No cognitive impairment observed.  
* Bathing: performs independently.  
* Dressing: performs independently.  
* Walking: performs independently.  
* Toileting: performs independently.  
* Feeding: performs independently.  
* Personal Hygiene: performs independently.  
* Bowels: continent.  
* Bladder: continent.  
* Managing Finances: performs independently.  
* Shopping: performs independently.  
* Managing Medications: performs independently.  
* Housework / Basic Home Maintenance: performs independently.  
* Handling Transportation: performs independently.  
* Preparing Meals: performs independently.  
* Using the Telephone/ Communication Devices: performs independently.  
* Falls Risk Screening:. ELLA has not fallen in the last 6 months.  
* Home safety risk factors: no grab bars in the bathroom.  
* Advance directives:. Advance Care Planning discussed and documented in the 
  medical record, patient did not wish or was not able to name a surrogate 
  decision maker or provide an advance care plan. Patient has living will. 
  Patient has healthcare POA.  
* 1 week cold sx incl cough several covid tests negative  
* Since the last office visit there have been no interval operations, 
  hospitalizations, important illnesses or injuries.  
* ha s uti sx woth brning freq and burning  
* HTN-Takes and tolerates meds without side effects. No alcohol. no tobacco. no 
  exercise. low salt. Reviewed recommendation for 150 minutes of exercise per 
  week including 2 days of weight training if over age 50  
* Hyperlipidemia- is on statin and a prudent diet.  
* OA/hip bursitis, arm pain on meloxicam. saw minor ortho told bursitis and 
  got shot and meloxicam  
* to get flu and covid  
* last colon was at drt and unsure of year  
* occ driving and wont rememeber how to get where shes going  
[facility]  
Work Phone: [phone] for referral (narrative)* Consultation 
  (Routine) - Authorized  
  
                          Specialty    Diagnoses / Procedures Referred By Contac  
t Referred To Contact  
   
                                        Primary Care          
  
  
Diagnoses  
  
  
Increased frequency of   
urination  
  
  
DAVID (stress urinary   
incontinence, female)  
  
  
Benign hypertension  
  
  
Mixed hyperlipidemia  
  
  
  
Procedures  
  
  
Follow Up In Primary Care                 
  
  
Layo Colindres MD  
  
  
[address] [id number]  
  
  
Phone: [phone]  
  
  
Fax: [phone]                         
  
  
  
  
  
                    Referral ID Status    Reason    Start Date Expiration Date V  
isits   
Requested                               Visits   
Authorized  
   
                [id number]  Authorized         2023 1       1  
  
  
  
  
Electronically signed by Layo Colindres MD at 2023 10:19 AM [facility]  
Work Phone: [phone] for referral (narrative)* Consultation 
  (Routine) - Authorized  
  
                          Specialty    Diagnoses / Procedures Referred By Contac  
t Referred To Contact  
   
                                        Primary Care          
  
  
Diagnoses  
  
  
Benign hypertension  
  
  
  
Procedures  
  
  
Follow Up In Primary Care -   
Aleena Bradley   
APRN-CNP  
  
  
 [place] [address]  
  
  
Phone: [phone]  
  
  
Fax: [phone]                         
  
  
  
  
  
                    Referral ID Status    Reason    Start Date Expiration Date V  
isits   
Requested                               Visits   
Authorized  
   
                [id number]  Authorized         2023 2/3/2024 1       1  
  
  
  
  
Electronically signed by Aleena Dunham APRN-CNP at 2023 9:35 AM [facility]  
Work Phone: [phone] for referral (narrative)* Consultation 
  (Routine) - Authorized  
  
                          Specialty    Diagnoses / Procedures Referred By Contac  
t Referred To Contact  
   
                                        Primary Care          
  
  
Diagnoses  
  
  
Benign hypertension  
  
  
Mixed hyperlipidemia  
  
  
  
Procedures  
  
  
Follow Up In Primary Care                 
  
  
Layo Colindres MD  
  
  
[address] [id number]  
  
  
Phone: [phone]  
  
  
Fax: [phone]                         
  
  
  
  
  
                    Referral ID Status    Reason    Start Date Expiration Date V  
isits   
Requested                               Visits   
Authorized  
   
                [id number] Authorized         2023 1       1  
  
  
  
  
Electronically signed by Layo Colindres MD at 2023 9:47 AM EST  
  
  
[facility]  
Work Phone: [phone]  
  
Summary Purpose  
  
  
                                                      
  
  
  
Family History  
No Family History Records Found      Mother  
  
                                Name            Dates           Details  
   
                                                    Family history of chronic br  
onchitis(V17.6, Z83.6)  
                                                    Status:Active  
   
                                                    Family history of heart fail  
ure(V17.49, Z82.49)  
                                                    Status:Active  
  
                                     Father  
  
                                Name            Dates           Details  
   
                                                    Family history of acute myoc  
ardial infarction(V17.3, Z82.49)  
                                                    Status:Active  
   
                                                    Family history of coronary a  
rtery disease(V17.3, Z82.49)  
                                                    Status:Active  
  
                                     Sister  
  
                                Name            Dates           Details  
   
                                                    Family history of hyperlipid  
emia(V18.19, Z83.438)  
                                                    Status:Active  
   
                                                    Family history of hypertensi  
on(V17.49, Z82.49)  
                                                    Status:Active  
  
                                     Mother  
  
                                Name            Dates           Details  
   
                                                    Family history of chronic br  
onchitis(V17.6, Z83.6)  
                                                    Status:Active  
   
                                                    Family history of heart fail  
ure(V17.49, Z82.49)  
                                                    Status:Active  
  
                                     Father  
  
                                Name            Dates           Details  
   
                                                    Family history of acute myoc  
ardial infarction(V17.3, Z82.49)  
                                                    Status:Active  
   
                                                    Family history of coronary a  
rtery disease(V17.3, Z82.49)  
                                                    Status:Active  
  
                                     Sister  
  
                                Name            Dates           Details  
   
                                                    Family history of hyperlipid  
emia(V18.19, Z83.438)  
                                                    Status:Active  
   
                                                    Family history of hypertensi  
on(V17.49, Z82.49)  
                                                    Status:Active  
  
                                     Mother  
  
                                Name            Dates           Details  
   
                                                    Family history of chronic br  
onchitis(V17.6, Z83.6)  
                                                    Status:Active  
   
                                                    Family history of heart fail  
ure(V17.49, Z82.49)  
                                                    Status:Active  
  
                                     Father  
  
                                Name            Dates           Details  
   
                                                    Family history of acute myoc  
ardial infarction(V17.3, Z82.49)  
                                                    Status:Active  
   
                                                    Family history of coronary a  
rtery disease(V17.3, Z82.49)  
                                                    Status:Active  
  
                                     Sister  
  
                                Name            Dates           Details  
   
                                                    Family history of hyperlipid  
emia(V18.19, Z83.438)  
                                                    Status:Active  
   
                                                    Family history of hypertensi  
on(V17.49, Z82.49)  
                                                    Status:Active  
  
                              Unknown Family Member  
  
                                Name            Dates           Details  
   
                                                    S/P CABG (coronary artery by  
pass graft): Brother(V45.81, Z95.1)  
                                                    Status:Active  
   
                                                    Family history of hypertensi  
on: Sister(V17.49, Z82.49)  
                                                    Status:Active  
   
                                                    Family history of hyperlipid  
emia: Sister(V18.19, Z83.438)  
                                                    Status:Active  
   
                                                    Family history of heart fail  
ure: Mother(V17.49, Z82.49)  
                                                    Status:Active  
   
                                                    Family history of chronic br  
onchitis: Mother(V17.6, Z83.6)  
                                                    Status:Active  
   
                                                    Family history of coronary a  
rtery disease: Father(V17.3, Z82.49)  
                                                    Status:Active  
   
                                                    Family history of acute myoc  
ardial infarction: Father(V17.3,   
Z82.49)  
                                                    Status:Active  
  
                              Unknown Family Member  
  
                                Name            Dates           Details  
   
                                                    Family history of acute myoc  
ardial infarction: Father(V17.3,   
Z82.49)  
                                                    Status:Active  
   
                                                    Family history of coronary a  
rtery disease: Father(V17.3, Z82.49)  
                                                    Status:Active  
   
                                                    Family history of chronic br  
onchitis: Mother(V17.6, Z83.6)  
                                                    Status:Active  
   
                                                    Family history of heart fail  
ure: Mother(V17.49, Z82.49)  
                                                    Status:Active  
   
                                                    Family history of hyperlipid  
emia: Sister(V18.19, Z83.438)  
                                                    Status:Active  
   
                                                    Family history of hypertensi  
on: Sister(V17.49, Z82.49)  
                                                    Status:Active  
   
                                                    S/P CABG (coronary artery by  
pass graft): Brother(V45.81, Z95.1)  
                                                    Status:Active  
  
                              Unknown Family Member  
  
                                Name            Dates           Details  
   
                                                    Family history of acute myoc  
ardial infarction: Father(V17.3,   
Z82.49)  
                                                    Status:Active  
   
                                                    Family history of coronary a  
rtery disease: Father(V17.3, Z82.49)  
                                                    Status:Active  
   
                                                    Family history of chronic br  
onchitis: Mother(V17.6, Z83.6)  
                                                    Status:Active  
   
                                                    Family history of heart fail  
ure: Mother(V17.49, Z82.49)  
                                                    Status:Active  
   
                                                    Family history of hyperlipid  
emia: Sister(V18.19, Z83.438)  
                                                    Status:Active  
   
                                                    Family history of hypertensi  
on: Sister(V17.49, Z82.49)  
                                                    Status:Active  
   
                                                    S/P CABG (coronary artery by  
pass graft): Brother(V45.81, Z95.1)  
                                                    Status:Active  
  
                              Unknown Family Member  
  
                                Name            Dates           Details  
   
                                                    Family history of acute myoc  
ardial infarction: Father(V17.3,   
Z82.49)  
                                                    Status:Active  
   
                                                    Family history of coronary a  
rtery disease: Father(V17.3, Z82.49)  
                                                    Status:Active  
   
                                                    Family history of chronic br  
onchitis: Mother(V17.6, Z83.6)  
                                                    Status:Active  
   
                                                    Family history of heart fail  
ure: Mother(V17.49, Z82.49)  
                                                    Status:Active  
   
                                                    Family history of hyperlipid  
emia: Sister(V18.19, Z83.438)  
                                                    Status:Active  
   
                                                    Family history of hypertensi  
on: Sister(V17.49, Z82.49)  
                                                    Status:Active  
   
                                                    S/P CABG (coronary artery by  
pass graft): Brother(V45.81, Z95.1)  
                                                    Status:Active  
  
                              Unknown Family Member  
  
                                Name            Dates           Details  
   
                                                    Family history of acute myoc  
ardial infarction: Father(V17.3,   
Z82.49)  
                                                    Status:Active  
   
                                                    Family history of coronary a  
rtery disease: Father(V17.3, Z82.49)  
                                                    Status:Active  
   
                                                    Family history of chronic br  
onchitis: Mother(V17.6, Z83.6)  
                                                    Status:Active  
   
                                                    Family history of heart fail  
ure: Mother(V17.49, Z82.49)  
                                                    Status:Active  
   
                                                    Family history of hyperlipid  
emia: Sister(V18.19, Z83.438)  
                                                    Status:Active  
   
                                                    Family history of hypertensi  
on: Sister(V17.49, Z82.49)  
                                                    Status:Active  
   
                                                    S/P CABG (coronary artery by  
pass graft): Brother(V45.81, Z95.1)  
                                                    Status:Active  
  
                              Unknown Family Member  
  
                                Name            Dates           Details  
   
                                                    S/P CABG (coronary artery by  
pass graft): Brother(V45.81, Z95.1)  
                                                    Status:Active  
   
                                                    Family history of hypertensi  
on: Sister(V17.49, Z82.49)  
                                                    Status:Active  
   
                                                    Family history of hyperlipid  
emia: Sister(V18.19, Z83.438)  
                                                    Status:Active  
   
                                                    Family history of heart fail  
ure: Mother(V17.49, Z82.49)  
                                                    Status:Active  
   
                                                    Family history of chronic br  
onchitis: Mother(V17.6, Z83.6)  
                                                    Status:Active  
   
                                                    Family history of coronary a  
rtery disease: Father(V17.3, Z82.49)  
                                                    Status:Active  
   
                                                    Family history of acute myoc  
ardial infarction: Father(V17.3,   
Z82.49)  
                                                    Status:Active  
  
                              Unknown Family Member  
  
                                Name            Dates           Details  
   
                                                    Family history of acute myoc  
ardial infarction: Father(V17.3,   
Z82.49)  
                                                    Status:Active  
   
                                                    Family history of coronary a  
rtery disease: Father(V17.3, Z82.49)  
                                                    Status:Active  
   
                                                    Family history of chronic br  
onchitis: Mother(V17.6, Z83.6)  
                                                    Status:Active  
   
                                                    Family history of heart fail  
ure: Mother(V17.49, Z82.49)  
                                                    Status:Active  
   
                                                    Family history of hyperlipid  
emia: Sister(V18.19, Z83.438)  
                                                    Status:Active  
   
                                                    Family history of hypertensi  
on: Sister(V17.49, Z82.49)  
                                                    Status:Active  
   
                                                    S/P CABG (coronary artery by  
pass graft): Brother(V45.81, Z95.1)  
                                                    Status:Active  
  
                              Unknown Family Member  
  
                                Name            Dates           Details  
   
                                                    S/P CABG (coronary artery by  
pass graft): Brother(V45.81, Z95.1)  
                                                    Status:Active  
   
                                                    Family history of hypertensi  
on: Sister(V17.49, Z82.49)  
                                                    Status:Active  
   
                                                    Family history of hyperlipid  
emia: Sister(V18.19, Z83.438)  
                                                    Status:Active  
   
                                                    Family history of heart fail  
ure: Mother(V17.49, Z82.49)  
                                                    Status:Active  
   
                                                    Family history of chronic br  
onchitis: Mother(V17.6, Z83.6)  
                                                    Status:Active  
   
                                                    Family history of coronary a  
rtery disease: Father(V17.3, Z82.49)  
                                                    Status:Active  
   
                                                    Family history of acute myoc  
ardial infarction: Father(V17.3,   
Z82.49)  
                                                    Status:Active  
  
                              Unknown Family Member  
  
                                Name            Dates           Details  
   
                                                    Family history of acute myoc  
ardial infarction: Father(V17.3,   
Z82.49)  
                                                    Status:Active  
   
                                                    Family history of coronary a  
rtery disease: Father(V17.3, Z82.49)  
                                                    Status:Active  
   
                                                    Family history of chronic br  
onchitis: Mother(V17.6, Z83.6)  
                                                    Status:Active  
   
                                                    Family history of heart fail  
ure: Mother(V17.49, Z82.49)  
                                                    Status:Active  
   
                                                    Family history of hyperlipid  
emia: Sister(V18.19, Z83.438)  
                                                    Status:Active  
   
                                                    Family history of hypertensi  
on: Sister(V17.49, Z82.49)  
                                                    Status:Active  
   
                                                    S/P CABG (coronary artery by  
pass graft): Brother(V45.81, Z95.1)  
                                                    Status:Active  
  
                              Unknown Family Member  
  
                                Name            Dates           Details  
   
                                                    Family history of acute myoc  
ardial infarction: Father(V17.3,   
Z82.49)  
                                                    Status:Active  
   
                                                    Family history of coronary a  
rtery disease: Father(V17.3, Z82.49)  
                                                    Status:Active  
   
                                                    Family history of chronic br  
onchitis: Mother(V17.6, Z83.6)  
                                                    Status:Active  
   
                                                    Family history of heart fail  
ure: Mother(V17.49, Z82.49)  
                                                    Status:Active  
   
                                                    Family history of hyperlipid  
emia: Sister(V18.19, Z83.438)  
                                                    Status:Active  
   
                                                    Family history of hypertensi  
on: Sister(V17.49, Z82.49)  
                                                    Status:Active  
   
                                                    S/P CABG (coronary artery by  
pass graft): Brother(V45.81, Z95.1)  
                                                    Status:Active  
  
                              Unknown Family Member  
  
                                Name            Dates           Details  
   
                                                    Family history of acute myoc  
ardial infarction: Father(V17.3,   
Z82.49)  
                                                    Status:Active  
   
                                                    Family history of coronary a  
rtery disease: Father(V17.3, Z82.49)  
                                                    Status:Active  
   
                                                    Family history of chronic br  
onchitis: Mother(V17.6, Z83.6)  
                                                    Status:Active  
   
                                                    Family history of heart fail  
ure: Mother(V17.49, Z82.49)  
                                                    Status:Active  
   
                                                    Family history of hyperlipid  
emia: Sister(V18.19, Z83.438)  
                                                    Status:Active  
   
                                                    Family history of hypertensi  
on: Sister(V17.49, Z82.49)  
                                                    Status:Active  
   
                                                    S/P CABG (coronary artery by  
pass graft): Brother(V45.81, Z95.1)  
                                                    Status:Active  
  
                              Unknown Family Member  
  
                                Name            Dates           Details  
   
                                                    Family history of acute myoc  
ardial infarction: Father(V17.3,   
Z82.49)  
                                                    Status:Active  
   
                                                    Family history of coronary a  
rtery disease: Father(V17.3, Z82.49)  
                                                    Status:Active  
   
                                                    Family history of chronic br  
onchitis: Mother(V17.6, Z83.6)  
                                                    Status:Active  
   
                                                    Family history of heart fail  
ure: Mother(V17.49, Z82.49)  
                                                    Status:Active  
   
                                                    Family history of hyperlipid  
emia: Sister(V18.19, Z83.438)  
                                                    Status:Active  
   
                                                    Family history of hypertensi  
on: Sister(V17.49, Z82.49)  
                                                    Status:Active  
   
                                                    S/P CABG (coronary artery by  
pass graft): Brother(V45.81, Z95.1)  
                                                    Status:Active  
  
                              Unknown Family Member  
  
                                Name            Dates           Details  
   
                                                    Family history of acute myoc  
ardial infarction: Father(V17.3,   
Z82.49)  
                                                    Status:Active  
   
                                                    Family history of coronary a  
rtery disease: Father(V17.3, Z82.49)  
                                                    Status:Active  
   
                                                    Family history of chronic br  
onchitis: Mother(V17.6, Z83.6)  
                                                    Status:Active  
   
                                                    Family history of heart fail  
ure: Mother(V17.49, Z82.49)  
                                                    Status:Active  
   
                                                    Family history of hyperlipid  
emia: Sister(V18.19, Z83.438)  
                                                    Status:Active  
   
                                                    Family history of hypertensi  
on: Sister(V17.49, Z82.49)  
                                                    Status:Active  
   
                                                    S/P CABG (coronary artery by  
pass graft): Brother(V45.81, Z95.1)  
                                                    Status:Active  
  
                              Unknown Family Member  
  
                                Name            Dates           Details  
   
                                                    Family history of hypertensi  
on: Sister(V17.49, Z82.49)  
                                                    Status:Active  
   
                                                    Family history of hyperlipid  
emia: Sister(V18.19, Z83.438)  
                                                    Status:Active  
   
                                                    Family history of heart fail  
ure: Mother(V17.49, Z82.49)  
                                                    Status:Active  
   
                                                    Family history of chronic br  
onchitis: Mother(V17.6, Z83.6)  
                                                    Status:Active  
   
                                                    Family history of coronary a  
rtery disease: Father(V17.3, Z82.49)  
                                                    Status:Active  
   
                                                    Family history of acute myoc  
ardial infarction: Father(V17.3,   
Z82.49)  
                                                    Status:Active  
   
                                                    S/P CABG (coronary artery by  
pass graft): Brother(V45.81, Z95.1)  
                                                    Status:Active  
  
                              Unknown Family Member  
  
                                Name            Dates           Details  
   
                                                    Family history of acute myoc  
ardial infarction: Father(V17.3,   
Z82.49)  
                                                    Status:Active  
   
                                                    Family history of coronary a  
rtery disease: Father(V17.3, Z82.49)  
                                                    Status:Active  
   
                                                    Family history of chronic br  
onchitis: Mother(V17.6, Z83.6)  
                                                    Status:Active  
   
                                                    Family history of heart fail  
ure: Mother(V17.49, Z82.49)  
                                                    Status:Active  
   
                                                    Family history of hyperlipid  
emia: Sister(V18.19, Z83.438)  
                                                    Status:Active  
   
                                                    Family history of hypertensi  
on: Sister(V17.49, Z82.49)  
                                                    Status:Active  
   
                                                    S/P CABG (coronary artery by  
pass graft): Brother(V45.81, Z95.1)  
                                                    Status:Active  
  
  
  
Advance Directives  
No Advanced Directives Records FoundNo Advanced Directives Records FoundNo 
Advanced Directives Records FoundNo Advanced Directives Records FoundNo Advanced
 Directives Records FoundNo Advanced Directives Records FoundNo Advanced 
Directives Records FoundNo Advanced Directives Records FoundNo Advanced
Directives Records Found  
  
Chief Complaint  
6 MO FU. REV LABSREVIEW CARDIAC CALCIUM TEST6 MO FUURINE FREQUENCY AND BURNING X
 3 WKSMCW..6 MO FU. REV LABS WILL DO UA FOR URINE FREQUENCY  
  
Medications Administered Section  
      Active Administered Medications - up to 3 most recent administrations  
  
                    Medication Order MAR Action Action Date Dose      Rate        
Site  
   
                                                      
  
  
fluorescein-benoxinate 0.25-0.4 %   
1 Drop (FLURESS)  
  
  
1 Drop, BOTH EYES, AS DIRECTED,   
Starting on Tue 3/29/22 at 1130,   
Until Tue 3/29/22 at 2329,   
Administer for applanation   
tonometry. In the event of a   
Fluress shortage, administer   
Stanfield-Fluor 1 drop into both eyes   
as directed for applanation   
tonometry    Given        2022 11:30 AM EDT 1 Drop                      
   
                                                               
  
     Inactive Administered Medications - up to 3 most recent administrations  
  
                    Medication Order MAR Action Action Date Dose      Rate        
Site  
   
                                                      
  
  
fluorescein-benoxinate 0.25-0.4 %   
1 Drop (FLURESS)  
  
  
1 Drop, BOTH EYES, AS DIRECTED,   
Starting on 23 at 1030,   
Until 23 at 2229,   
Administer for applanation   
tonometry. In the event of a   
Fluress shortage, administer   
Hortencia-Fluor 1 drop into both eyes   
as directed for applanation   
tonometry    Given        2023 10:30 AM EDT 1 Drop                      
   
                                                               
  
      Active Administered Medications - up to 3 most recent administrations  
  
                    Medication Order MAR Action Action Date Dose      Rate        
Site  
   
                                                      
  
  
fluorescein-benoxinate 0.3-0.4 %   
1 Drop (FLURESS)  
  
  
1 Drop, BOTH EYES, AS DIRECTED,   
Starting on Wed 10/25/23 at 1100,   
Until Wed 10/25/23 at 2259,   
Administer for applanation   
tonometry. In the event of a   
Fluress shortage, administer   
Hortencia-Fluor 1 drop into both eyes   
as directed for applanation   
tonometry    Given        10/25/2023 11:00 AM EDT 1 Drop                      
   
                                                               
  
  
  
Reason for Referral  
  
  
                          Specialty    Diagnoses / Procedures Referred By Contac  
t Referred To Contact  
   
                                        Radiology             
  
  
Diagnoses  
  
  
Encounter for screening   
mammogram for malignant   
neoplasm of breast  
  
  
  
Procedures  
  
  
BI mammo bilateral screening   
tomosynthesis                             
  
  
Aleena Dunham,   
APRN-CNP  
  
  
[address] [place] [address]  
  
  
Phone: [phone]  
  
  
Fax: [phone]                         
  
  
  
  
  
                          Referral ID  Status       Reason       Start   
Date                                    Expiration   
Date                                    Visits   
Requested                               Visits   
Authorized  
   
                                [id number]          Authorized        
  
  
Perform   
Procedure       2023       3/9/2024        1               1  
  
  
  
Additional Source Comments  
  
  
  
                                                    INFORMATION SOURCE (unrecogn  
ized section and content)  
   
                                          
  
  
  
                                          
   
                                          
  
  
  
                                DATE CREATED    AUTHOR          AUTHOR'S ORGANIZ  
ATION  
   
                                2021                      [facility]  
nter  
  
  
  
                                DATE CREATED    AUTHOR          AUTHOR'S ORGANIZ  
ATION  
   
                                2022                      [facility]  
  
  
  
                                DATE CREATED    AUTHOR          AUTHOR'S ORGANIZ  
ATION  
   
                                2022                       [facility]  
  
  
  
                                DATE CREATED    AUTHOR          AUTHOR'S ORGANIZ  
ATION  
   
                                2022                      [facility]  
  
  
  
                                DATE CREATED    AUTHOR          AUTHOR'S ORGANIZ  
ATION  
   
                                10/27/2023                      [facility]  
  
  
  
                                DATE CREATED    AUTHOR          AUTHOR'S ORGANIZ  
ATION  
   
                                2023                      [facility]  
  
  
  
                                DATE CREATED    AUTHOR          AUTHOR'S ORGANIZ  
ATION  
   
                                2023                      [facility]  
  
  
  
                                DATE CREATED    AUTHOR          AUTHOR'S ORGANIZ  
ATION  
   
                                12/10/2023                      [facility]  
  
  
  
  
  
                                                    Source Comments (unrecognize  
d section and content)  
   
                                                    In the event this informatio  
n is protected by the Federal Confidentiality of   
Alcohol   
and Drug Abuse Patient Records regulations: This information has been disclosed 
to   
you from records protected by Federal confidentiality rules (42 CFR Part 2). The
   
Federal rules prohibit you from making any further disclosure of this 
information   
unless further disclosure is expressly permitted by the written consent of the 
person   
to whom it pertains or as otherwise permitted by 42 CFR Part 2. A general   
authorization for the release of medical or other information is NOT sufficient 
for   
this purpose. The Federal rules restrict any use of the information to 
criminally   
investigate or prosecute any alcohol or drug abuse patient.[facility] 
the   
event this information is protected by the Federal Confidentiality of Alcohol 
and   
Drug Abuse Patient Records regulations: This information has been disclosed to 
you   
from records protected by Federal confidentiality rules (42 CFR Part 2). The 
Federal   
rules prohibit you from making any further disclosure of this information unless
   
further disclosure is expressly permitted by the written consent of the person 
to   
whom it pertains or as otherwise permitted by 42 CFR Part 2. A general 
authorization   
for the release of medical or other information is NOT sufficient for this 
purpose.   
The Federal rules restrict any use of the information to criminally investigate 
or   
prosecute any alcohol or drug abuse patient.[facility] DaytonIn the event this   
information is protected by the Federal Confidentiality of Alcohol and Drug 
Abuse   
Patient Records regulations: This information has been disclosed to you from 
records   
protected by Federal confidentiality rules (42 CFR Part 2). The Federal rules   
prohibit you from making any further disclosure of this information unless 
further   
disclosure is expressly permitted by the written consent of the person to whom 
it   
pertains or as otherwise permitted by 42 CFR Part 2. A general authorization for
 the   
release of medical or other information is NOT sufficient for this purpose. The   
Federal rules restrict any use of the information to criminally investigate or   
prosecute any alcohol or drug abuse patient.[facility]  
  
  
  
  
                                                    Reason for Visit (unrecogniz  
ed section and content)  
   
                                          
  
  
  
                                          
   
                                          
  
  
  
                                        Reason              Comments  
   
                                        Primary Open Angle Glaucoma Evaluation   
  
  
  
                                        Reason              Comments  
   
                                        6 mo fu               
  
  
  
                                        Reason              Comments  
   
                                        UTI                   
   
                                        BP concerns           
  
  
  
                                        Reason              Comments  
   
                                        Hypertension        140's / 70-90's read  
ings  
  
  
  
                                        Reason              Comments  
   
                                        Hypertension        Pt states blood pres  
sure running higher than she would like  
  
  
  
                          Specialty    Diagnoses / Procedures Referred By Contac  
t Referred To Contact  
   
                                        Primary Care          
  
  
Diagnoses  
  
  
Benign hypertension  
  
  
  
Procedures  
  
  
Follow Up In Primary Care -   
Established                               
  
  
Aleena Dunham,   
APRN-CNP  
  
  
[address] [place] [address]  
  
  
Phone: [phone]  
  
  
Fax: [phone]                         
  
  
  
  
  
                    Referral ID Status    Reason    Start Date Expiration Date V  
isits   
Requested                               Visits   
Authorized  
   
                [id number]  Authorized         2023 2/3/2024 1       1  
  
  
  
                                        Reason              Comments  
   
                                        Medicare Annual Wellness Visit Subsequen  
t   
  
  
  
                          Specialty    Diagnoses / Procedures Referred By Contac  
t Referred To Contact  
   
                                        Primary Care          
  
  
Diagnoses  
  
  
Increased frequency of   
urination  
  
  
DAVID (stress urinary   
incontinence, female)  
  
  
Benign hypertension  
  
  
Mixed hyperlipidemia  
  
  
  
Procedures  
  
  
Follow Up In Primary Care                 
  
  
Layo Colindres MD  
  
  
 [place] [address]  
  
  
Phone: [phone]  
  
  
Fax: [phone]                         
  
  
  
  
  
                Referral ID Status  Reason  Start Date Expiration Date Visits Re  
quested Visits   
Authorized  
   
                [id number]  Closed          2023 1       1  
  
  
  
  
  
                                                    Care Teams (unrecognized sec  
tion and content)  
   
                                          
  
  
  
                                          
   
                                          
  
  
  
                      Team Member Relationship Specialty  Start Date End Date  
   
                                                      
  
  
Layo Colindres  
  
  
[phone] (Work)  
  
  
[phone] (Fax) PCP - General   Family Medicine 3/13/14           
  
  
  
                      Team Member Relationship Specialty  Start Date End Date  
   
                                                      
  
  
Layo Colindres MD  
  
  
NPI: [id number]  
  
  
 [place] [id number]  
  
  
[phone] (Work)  
  
  
[phone] (Fax) PCP - General                   9/3/19            
   
                                                      
  
  
Layo Colindres MD  
  
  
NPI: [id number]  
  
  
 [place] [id number]  
  
  
[phone] (Work)  
  
  
[phone] (Fax)                      PCP - [employer] PCP                           22                
  
  
  
                      Team Member Relationship Specialty  Start Date End Date  
   
                                                      
  
  
Layo Colindres MD  
  
  
NPI: [id number]  
  
  
 [place] [address]  
  
  
[phone] (Work)  
  
  
[phone] (Fax) PCP - General                   9/3/19            
   
                                                      
  
  
Layo Colindres MD  
  
  
NPI: [id number]  
  
  
 [place] [address]  
  
  
[phone] (Work)  
  
  
[phone] (Fax)                      PCP - [employer] PCP                           22                
  
  
  
                      Team Member Relationship Specialty  Start Date End Date  
   
                                                      
  
  
Layo Colindres MD  
  
  
NPI: [id number]  
  
  
 [place] [address]  
  
  
[phone] (Work)  
  
  
[phone] (Fax) PCP - General                   9/3/19            
   
                                                      
  
  
Layo Colindres MD  
  
  
NPI: [id number]  
  
  
 [place] [id number]  
  
  
[phone] (Work)  
  
  
[phone] (Fax)                      PCP - [employer] PCP                           22                
  
  
  
                      Team Member Relationship Specialty  Start Date End Date  
   
                                                      
  
  
Layo Colindres MD  
  
  
NPI: [id number]  
  
  
 [address]  
  
  
[place] [address]  
  
  
[phone] (Work)  
  
  
[phone] (Fax) PCP - General                   9/3/19            
   
                                                      
  
  
Layo Colindres MD  
  
  
NPI: [id number]  
  
  
 [facility]  
  
  
[place] [address]  
  
  
[phone] (Work)  
  
  
[phone] (Fax)                      PCP  [employer] PCP                           22                
  
  
  
                      Team Member Relationship Specialty  Start Date End Date  
   
                                                      
  
  
Layo Colindres MD  
  
  
NPI: [id number]  
  
  
[phone] (Work)  
  
  
[phone] (Fax) PCP [facility]   Family Medicine 3/13/14           
  
  
  
                      Team Member Relationship Specialty  Start Date End Date  
   
                                                      
  
  
Layo Colindres MD  
  
  
NPI: [id number]  
  
  
[address] [place] [address]  
  
  
[phone] (Work)  
  
  
[phone] (Fax) PCP  General                   9/3/19            
   
                                                      
  
  
Layo Colindres MD  
  
  
NPI: [id number]  
  
  
 [place] [address]  
  
  
[phone] (Work)  
  
  
[phone] (Fax)                      PCP - [employer] PCP                           22                
  
  
FOR RECORDS PERTAINING TO PATIENTS WHO ARE OR HAVE BEEN ENROLLED IN A CHEMICAL 
DEPENDENCY/SUBSTANCEABUSE PROGRAM, SOME INFORMATION MAY BE OMITTED. This 
clinical summary was aggregated from multiple sources. Caution should be 
exercised in using it in the provision of clinical care. This summary normalizes
 information from multiple sources, and as a consequence, information in this 
document may materially change the coding, format and clinical context of 
patient data. In addition, data may be omitted in some cases. CLINICAL DECISIONS
 SHOULD BE BASED ON THE PRIMARY CLINICAL RECORDS. [facility] [employer] [facility]. provides
 no warranty or guarantee of the accuracy or completeness of information in this
 document.

## 2024-03-15 NOTE — [id number]
HPI
History of Present Illness
Chief Complaint: Lower Extremity Injury
Informant: patient
Onset/Context/Timing
Onset: Today
Narrative
Narrative: 
Patient present secondary to right ankle injury.  She states about 830 this evening she fell down 2 steps with her ankle tucked back underneath her.  She denies any other injury.

[facility]
Medical History (Reviewed 03/15/24 @ 22:57 by Dr. Sofya aSldaña MD)

Glaucoma
HTN (hypertension)
Hyperlipidemia
Laceration of liver
Sprained ankle


Home Medications

amlodipine 5 mg tablet 5 mg PO DAILY 03/15/24 [History Last Taken Unknown]
aspirin 81 mg tablet,delayed release (Adult Aspirin Regimen) 81 mg PO DAILY 03/15/24 [History Last Taken Unknown]
atorvastatin 40 mg tablet 40 mg PO DAILY 03/15/24 [History Last Taken Unknown]
latanoprost 0.005 % eye drops 1 drp ophthalmic (eye) QHS 03/15/24 [History Last Taken Unknown]
hydrocodone-acetaminophen 5-325mg 5mg-325mg 1 tab PO Q6H PRN PRN Pain 3 days #12 TABLETS 03/16/24 [Rx Last Taken Unknown]




Allergy/AdvReac Type Severity Reaction Status Date / Time
No Known Allergies Allergy   Verified 03/15/24 21:00




Family History                 no significant family his                        


Social History (Reviewed 03/15/24 @ 22:57 by Dr. Sofya Saldaña MD)
household members:  spouse 
Smoking Status:  Never smoker 



ROS
ROS ED
Constitutional
Constitutional ED: Denies chills or fever(s)
Eyes
Eyes: Denies discharge from eye(s)
ENT
ENT ED: Denies discharge from eye(s), rhinorrhea or sore throat
Cardiovascular
Cardiovascular: Denies chest pain or palpitations
Respiratory/Chest
Respiratory/Chest: Denies cough or dyspnea
Gastrointestinal
Gastrointestinal: Denies abdominal pain, nausea or vomiting
Musculoskeletal
Musculoskeletal: Reports extremity pain; Denies back pain or neck pain
Integumentary
Denies Abrasions or rash
Neurologic
Neurologic: Denies headache(s) or weakness
Psychiatric
Psychiatric: Denies anxiety or depression
Allergic/Immunologic
Allergic/Immunologic ED: Denies lip swelling or urticaria

EXAM
Physical Exam
Const
Vital Signs: 



 03/15/24
20:58 03/15/24
23:00 03/16/24
00:15
Temperature 97 F L  98.1 F
Temperature Source Temporal  
Pulse Rate 87 75 74
Respiratory Rate 16 16 17
Blood Pressure 165/80 H 128/66 H 108/63
Blood Pressure Mean 108 86 78
Pulse Ox 97 95 97
Oxygen Delivery Method Room Air Room Air 



Positive well nourished and well developed
General Appearance ED: well developed
HEENT
Reports moist mucous membranes
Eyes
EOMs intact bilaterally
Chest Wall
inspection of chest normal and palpation of chest normal
Resp
normal respiratory effort and clear to auscultation bilaterally
Cardio
regular rate and regular rhythm
GI
non-tender
Palpation: soft
Extremity
Extremity Narrative: 
Edema and mild diffuse tenderness around the right ankle.  Good distal pulses.  Good sensation and can wiggle toes.  No tenderness at the knee or proximal fibula.
Neuro
oriented x3 and no sensory deficits noted
Psych
mental status grossly normal

MDM
MDM
MDM Narrative
Medical decision making narrative: 
Right ankle x-rays were obtained per nursing protocol.  Per my interpretation she has a distal fibula fracture with a posterior tibia fracture.
At the time of my exam patient has IV line initiated.  Labwork obtained to evaluate for leukocytosis, anemia, and electrolyte derangement.  Patient given morphine and Zofran for pain control.  CT scan of the right lower extremity obtained.
History & Record Review
Discussion w/independent historian: Patient
Lab Data
Attestation: I reviewed the patient's lab results.
Labs: 

Laboratory Results - last 24 hr

  03/15/24 03/15/24
  23:03 23:30
WBC  12.9 H 
RBC  4.66 
Hgb  13.6 
Hct  41.3 
MCV  88.6 
MCH  29.2 
MCHC  32.9 
RDW Std Deviation  43.6 
RDW Coeff of Natty  13.4 
Plt Count  155 
MPV  13.5 H 
Immature Gran % (Auto)  0.300 
Neut % (Auto)  75.8 H 
Lymph % (Auto)  16.1 L 
Mono % (Auto)  6.8 
Eos % (Auto)  0.4 
Baso % (Auto)  0.6 
Absolute Neuts (auto)  9.8 H 
Absolute Lymphs (auto)  2.07 
Nucleated RBC %  0 
Platelet Estimate  ADEQUATE 
RBC Morphology  NORM C+C 
Anisocytosis  RARE 
Sodium  Cancelled  141
Potassium  Cancelled  4.0
Chloride  Cancelled  109 H
Carbon Dioxide  Cancelled  27.0
Anion Gap  Cancelled  5
BUN  Cancelled  24 H
Creatinine  Cancelled  0.77
Estim Creat Clear Calc  Cancelled  55.98
Est GFR (MDRD) Af Amer  Cancelled  94
Est GFR (MDRD) Non-Af  Cancelled  78
BUN/Creatinine Ratio  Cancelled  31.2 H
Glucose  Cancelled  112 H
Calcium  Cancelled  8.9



Radiography
Diagnostic Testing: 

Clinical Impression(s) from Imaging Studies

Ankle X-Ray  03/15/24 21:10
IMPRESSION:
1.  Acute nondisplaced oblique fracture of the distal right fibula at the
level of the tibial plafond with surrounding soft tissue swelling.
2.  Suspect fracture of the posterior malleolus and tibia. CT may be
useful.
 
Electronically Signed:
Roderick Beck MD
2024/03/15 at 22:16 EDT
Reading Location ID and State: [address] / [place]
Tel [phone], Service support  [phone], Fax [phone]
 


Lower Extremity CT  03/15/24 22:31
IMPRESSION:
1.  Acute laterally displaced oblique fracture of the distal right fibula
at the level tibial plafond.
2.  Acute comminuted nondisplaced fracture of the posterior malleolus to
tibia.
 
Electronically Signed:
Roderick Beck MD
2024/03/15 at 23:30 EDT
Reading Location ID and State: [address] / [place]
Tel [phone], Service support  [phone], Fax [phone]
 




Treatment and Re-Evaluation
:: 
CBC was a white count of 12.9 with 75% neutrophils.  Hemoglobin is 13.6.  Chemistry studies unremarkable.  CT scan of the ankle reveals an acute laterally displaced oblique fracture of the distal right fibula at the level of the tibial plafond.  
Acute comminuted nondisplaced fracture of the posterior malleolus to the tibia.
Test results discussed with Dr. Lynne, on-call for podiatry.  Patient be splinted and will follow-up in the office early next week.

Procedures
Lower Extremity Splints
Lower Extremity Splint: Orthoglass and - (Posterior splint with sugar-tong placed.)
Splint Fabrication: Fabricated

Discharge Plan
Triage
Chief Complaint: Lower Extremity Injury

ED Provider: Sofya Saldaña

Dx/Rx/DC Orders
Clinical Impression:
 Bimalleolar ankle fracture


Instructions:  ED Ankle Fracture

Prescriptions:
New
  hydrocodone-acetaminophen 5-325 mg tablet 
   1 tab PO Q6H PRN PRN (Reason: Pain) 3 Days Qty: 12 0RF

No Action
  amlodipine 5 mg tablet 
   5 mg PO DAILY 
  latanoprost 0.005 % drops 
   1 drp ophthalmic (eye) QHS 
  atorvastatin 40 mg tablet 
   40 mg PO DAILY 
  aspirin [Adult Aspirin Regimen] 81 mg tablet,delayed release (DR/EC) 
   81 mg PO DAILY 

Primary Care Provider: Gabriel Colindres

Referrals:
Gabriel Lynne DPM [Med Staff - Active Staff] - As soon as possible
Gabriel Colindres MD [Primary Care Provider] - 

Disposition
***Disposition***: Home, Self Care

## 2024-03-15 NOTE — RAD_ITS
REPORT-ID:[id number] 
 
STUDY:   X-RAY - RIGHT ANKLE 
 
REASON FOR EXAM:   Female, 73 years old.  RIGHT ANKLE INJURY 
 
TECHNIQUE:   3 view(s) of the ankle. 
 
COMPARISON:   None. 
___________________________________ 
 
FINDINGS: 
Acute nondisplaced oblique fracture of the distal right fibula at the level 
tibial plafond. Suspect fracture through the posterior malleolus of the 
tibia as well.  Normal medial and lateral malleoli.  Normal tibiotalar 
articulation and ankle mortise. 
 
Normal visualized talus and calcaneus. 
 
The visualized subtalar, talonavicular, calcaneocuboid and tarsal 
articulations are normal. 
 
Lateral soft tissue swelling. 
___________________________________ 
 
ORDER #: [id number] RAD/Ankle min 3 Views  
IMPRESSION:  
1.  Acute nondisplaced oblique fracture of the distal right fibula at the  
level of the tibial plafond with surrounding soft tissue swelling.  
2.  Suspect fracture of the posterior malleolus and tibia. CT may be  
useful.  
 
  
Electronically Signed:  
Roderick Beck MD  
2024/03/15 at 22:16 EDT  
Reading Location ID and State: [address] / [place]  
Tel [phone], Service support  [phone], Fax [phone]

## 2024-03-15 NOTE — CT_ITS
REPORT-ID:[id number] 
 
CT RIGHT LOWER EXTREMITY WITH 3-D IMAGING 
 
CLINICAL INDICATION: rt ankle pain 
 
TECHNIQUE: Axial CT images of the RIGHT lower extremity was performed 
without IV contrast material.  Coronal and sagittal reformats were 
provided. 
 
RADIATION DOSAGE (If Supplied By Facility):  CTDIvol = ( 15.35 ) mGy, DLP = 
( 380.63 ) mGycm 
 
COMPARISON: X-ray earlier today 
 
FINDINGS: 
Bones: Acute laterally displaced oblique fracture of the distal right 
fibula at the level tibial spine. Acute comminuted nondisplaced oblique 
fracture of the posterior malleolus of the tibia. 
 
Soft Tissues: The deep soft tissue structures are unremarkable. 
Diffuse soft tissue swelling. 
 
ORDER #: [id number] CT/Extremity Lower without Contra  
IMPRESSION:  
1.  Acute laterally displaced oblique fracture of the distal right fibula  
at the level tibial plafond.  
2.  Acute comminuted nondisplaced fracture of the posterior malleolus to  
tibia.  
 
  
Electronically Signed:  
Roderick Beck MD  
2024/03/15 at 23:30 EDT  
Reading Location ID and State: [address] / [place]  
Tel [phone], Service support  [phone], Fax [phone]

## 2024-03-15 NOTE — EDS_ITS
HPI    
History of Present Illness    
Chief Complaint: Lower Extremity Injury    
Informant: patient    
Onset/Context/Timing    
Onset: Today    
Narrative    
Narrative:     
Patient present secondary to right ankle injury.  She states about 830 this   
evening she fell down 2 steps with her ankle tucked back underneath her.  She   
denies any other injury.    
    
[facility]    
Medical History (Reviewed 03/15/24 @ 22:57 by Dr. Sofya Saldaña MD)    
    
Glaucoma    
HTN (hypertension)    
Hyperlipidemia    
Laceration of liver    
Sprained ankle    
    
    
                                Home Medications    
    
amlodipine 5 mg tablet 5 mg PO DAILY 03/15/24 [History Last Taken Unknown]    
aspirin 81 mg tablet,delayed release (Adult Aspirin Regimen) 81 mg PO DAILY   
03/15/24 [History Last Taken Unknown]    
atorvastatin 40 mg tablet 40 mg PO DAILY 03/15/24 [History Last Taken Unknown]    
latanoprost 0.005 % eye drops 1 drp ophthalmic (eye) QHS 03/15/24 [History Last   
Taken Unknown]    
hydrocodone-acetaminophen 5-325mg 5mg-325mg 1 tab PO Q6H PRN PRN Pain 3 days #12  
TABLETS 03/16/24 [Rx Last Taken Unknown]    
    
    
                                            
    
    
    
Allergy/AdvReac Type Severity Reaction Status Date / Time    
     
No Known Allergies Allergy   Verified 03/15/24 21:00    
    
    
    
                                            
    
Family History                   no significant family his                        
       
    
    
Social History (Reviewed 03/15/24 @ 22:57 by Dr. Sofya Saldaña MD)    
household members:  spouse     
Smoking Status:  Never smoker     
    
    
    
ROS    
ROS ED    
Constitutional    
Constitutional ED: Denies chills or fever(s)    
Eyes    
Eyes: Denies discharge from eye(s)    
ENT    
ENT ED: Denies discharge from eye(s), rhinorrhea or sore throat    
Cardiovascular    
Cardiovascular: Denies chest pain or palpitations    
Respiratory/Chest    
Respiratory/Chest: Denies cough or dyspnea    
Gastrointestinal    
Gastrointestinal: Denies abdominal pain, nausea or vomiting    
Musculoskeletal    
Musculoskeletal: Reports extremity pain; Denies back pain or neck pain    
Integumentary    
Denies Abrasions or rash    
Neurologic    
Neurologic: Denies headache(s) or weakness    
Psychiatric    
Psychiatric: Denies anxiety or depression    
Allergic/Immunologic    
Allergic/Immunologic ED: Denies lip swelling or urticaria    
    
EXAM    
Physical Exam    
Const    
Vital Signs:     
    
                                            
    
    
    
 03/15/24    
20:58 03/15/24    
23:00 03/16/24    
00:15    
     
Temperature 97 F L  98.1 F    
     
Temperature Source Temporal      
     
Pulse Rate 87 75 74    
     
Respiratory Rate 16 16 17    
     
Blood Pressure 165/80 H 128/66 H 108/63    
     
Blood Pressure Mean 108 86 78    
     
Pulse Ox 97 95 97    
     
Oxygen Delivery Method Room Air Room Air     
    
    
    
    
Positive well nourished and well developed    
General Appearance ED: well developed    
HEENT    
Reports moist mucous membranes    
Eyes    
EOMs intact bilaterally    
Chest Wall    
inspection of chest normal and palpation of chest normal    
Resp    
normal respiratory effort and clear to auscultation bilaterally    
Cardio    
regular rate and regular rhythm    
GI    
non-tender    
Palpation: soft    
Extremity    
Extremity Narrative:     
Edema and mild diffuse tenderness around the right ankle.  Good distal pulses.    
Good sensation and can wiggle toes.  No tenderness at the knee or proximal fi  
bula.    
Neuro    
oriented x3 and no sensory deficits noted    
Psych    
mental status grossly normal    
    
MDM    
MDM    
MDM Narrative    
Medical decision making narrative:     
Right ankle x-rays were obtained per nursing protocol.  Per my interpretation s  
he has a distal fibula fracture with a posterior tibia fracture.    
At the time of my exam patient has IV line initiated.  Labwork obtained to   
evaluate for leukocytosis, anemia, and electrolyte derangement.  Patient given   
morphine and Zofran for pain control.  CT scan of the right lower extremity   
obtained.    
History & Record Review    
Discussion w/independent historian: Patient    
Lab Data    
Attestation: I reviewed the patient's lab results.    
Labs:     
    
                         Laboratory Results - last 24 hr    
    
    
    
  03/15/24 03/15/24    
    
  23:03 23:30    
     
WBC  12.9 H     
     
RBC  4.66     
     
Hgb  13.6     
     
Hct  41.3     
     
MCV  88.6     
     
MCH  29.2     
     
MCHC  32.9     
     
RDW Std Deviation  43.6     
     
RDW Coeff of Natty  13.4     
     
Plt Count  155     
     
MPV  13.5 H     
     
Immature Gran % (Auto)  0.300     
     
Neut % (Auto)  75.8 H     
     
Lymph % (Auto)  16.1 L     
     
Mono % (Auto)  6.8     
     
Eos % (Auto)  0.4     
     
Baso % (Auto)  0.6     
     
Absolute Neuts (auto)  9.8 H     
     
Absolute Lymphs (auto)  2.07     
     
Nucleated RBC %  0     
     
Platelet Estimate  ADEQUATE     
     
RBC Morphology  NORM C+C     
     
Anisocytosis  RARE     
     
Sodium  Cancelled  141    
     
Potassium  Cancelled  4.0    
     
Chloride  Cancelled  109 H    
     
Carbon Dioxide  Cancelled  27.0    
     
Anion Gap  Cancelled  5    
     
BUN  Cancelled  24 H    
     
Creatinine  Cancelled  0.77    
     
Estim Creat Clear Calc  Cancelled  55.98    
     
Est GFR (MDRD) Af Amer  Cancelled  94    
     
Est GFR (MDRD) Non-Af  Cancelled  78    
     
BUN/Creatinine Ratio  Cancelled  31.2 H    
     
Glucose  Cancelled  112 H    
     
Calcium  Cancelled  8.9    
    
    
    
    
Radiography    
Diagnostic Testing:     
    
Clinical Impression(s) from Imaging Studies    
    
Ankle X-Ray  03/15/24 21:10    
IMPRESSION:    
1.  Acute nondisplaced oblique fracture of the distal right fibula at the    
level of the tibial plafond with surrounding soft tissue swelling.    
2.  Suspect fracture of the posterior malleolus and tibia. CT may be    
useful.    
     
Electronically Signed:    
Roderick Beck MD    
2024/03/15 at 22:16 EDT    
Reading Location ID and State: [address] / [place]    
Tel [phone], Service support  [phone], Fax [phone]    
     
    
    
Lower Extremity CT  03/15/24 22:31    
IMPRESSION:    
1.  Acute laterally displaced oblique fracture of the distal right fibula    
at the level tibial plafond.    
2.  Acute comminuted nondisplaced fracture of the posterior malleolus to    
tibia.    
     
Electronically Signed:    
Roderick Beck MD    
2024/03/15 at 23:30 EDT    
Reading Location ID and State: [address] / [place]    
Tel [phone], Service support  [phone], Fax [phone]    
     
    
    
    
    
Treatment and Re-Evaluation    
::     
CBC was a white count of 12.9 with 75% neutrophils.  Hemoglobin is 13.6.    
Chemistry studies unremarkable.  CT scan of the ankle reveals an acute laterally  
displaced oblique fracture of the distal right fibula at the level of the tibial  
plafond.  Acute comminuted nondisplaced fracture of the posterior malleolus to   
the tibia.    
Test results discussed with Dr. Lynne, on-call for podiatry.  Patient be   
splinted and will follow-up in the office early next week.    
    
Procedures    
Lower Extremity Splints    
Lower Extremity Splint: Orthoglass and - (Posterior splint with sugar-tong   
placed.)    
Splint Fabrication: Fabricated    
    
Discharge Plan    
Triage    
Chief Complaint: Lower Extremity Injury    
    
ED Provider: Sofya Saldaña    
    
Dx/Rx/DC Orders    
Clinical Impression:    
 Bimalleolar ankle fracture    
    
    
Instructions:  ED Ankle Fracture    
    
Prescriptions:    
New    
  hydrocodone-acetaminophen 5-325 mg tablet     
   1 tab PO Q6H PRN PRN (Reason: Pain) 3 Days Qty: 12 0RF    
    
No Action    
  amlodipine 5 mg tablet     
   5 mg PO DAILY     
  latanoprost 0.005 % drops     
   1 p ophthalmic (eye) QHS     
  atorvastatin 40 mg tablet     
   40 mg PO DAILY     
  aspirin [Adult Aspirin Regimen] 81 mg tablet,delayed release (DR/EC)     
   81 mg PO DAILY     
    
Primary Care Provider: Gabriel Colindres    
    
Referrals:    
Gabriel Lynne DPM [Med Staff - Active Staff] - As soon as possible    
Gabriel Colindres MD [Primary Care Provider] -     
    
Disposition    
***Disposition***: Home, Self Care

## 2024-03-16 VITALS
OXYGEN SATURATION: 97 % | TEMPERATURE: 98.1 F | SYSTOLIC BLOOD PRESSURE: 108 MMHG | DIASTOLIC BLOOD PRESSURE: 63 MMHG | HEART RATE: 74 BPM | RESPIRATION RATE: 17 BRPM

## 2024-04-02 ENCOUNTER — TELEPHONE (OUTPATIENT)
Dept: PRIMARY CARE | Facility: CLINIC | Age: 74
End: 2024-04-02
Payer: MEDICARE

## 2024-04-02 DIAGNOSIS — Z98.1 STATUS POST ANKLE FUSION: ICD-10-CM

## 2024-06-06 ENCOUNTER — APPOINTMENT (OUTPATIENT)
Dept: PRIMARY CARE | Facility: CLINIC | Age: 74
End: 2024-06-06
Payer: MEDICARE

## 2024-06-18 ENCOUNTER — OFFICE VISIT (OUTPATIENT)
Dept: PRIMARY CARE | Facility: CLINIC | Age: 74
End: 2024-06-18
Payer: MEDICARE

## 2024-06-18 ENCOUNTER — LAB (OUTPATIENT)
Dept: LAB | Facility: LAB | Age: 74
End: 2024-06-18
Payer: MEDICARE

## 2024-06-18 VITALS
BODY MASS INDEX: 24.61 KG/M2 | OXYGEN SATURATION: 97 % | WEIGHT: 138.9 LBS | DIASTOLIC BLOOD PRESSURE: 62 MMHG | HEIGHT: 63 IN | SYSTOLIC BLOOD PRESSURE: 120 MMHG | HEART RATE: 71 BPM

## 2024-06-18 DIAGNOSIS — E78.2 MIXED HYPERLIPIDEMIA: ICD-10-CM

## 2024-06-18 DIAGNOSIS — I10 BENIGN HYPERTENSION: Primary | ICD-10-CM

## 2024-06-18 DIAGNOSIS — R74.8 ELEVATED ALKALINE PHOSPHATASE LEVEL: ICD-10-CM

## 2024-06-18 DIAGNOSIS — I10 BENIGN HYPERTENSION: ICD-10-CM

## 2024-06-18 LAB
ALBUMIN SERPL BCP-MCNC: 4.3 G/DL (ref 3.4–5)
ALP SERPL-CCNC: 183 U/L (ref 33–136)
ALT SERPL W P-5'-P-CCNC: 15 U/L (ref 7–45)
ANION GAP SERPL CALC-SCNC: 12 MMOL/L (ref 10–20)
AST SERPL W P-5'-P-CCNC: 20 U/L (ref 9–39)
BILIRUB SERPL-MCNC: 0.7 MG/DL (ref 0–1.2)
BUN SERPL-MCNC: 22 MG/DL (ref 6–23)
CALCIUM SERPL-MCNC: 9.5 MG/DL (ref 8.6–10.3)
CHLORIDE SERPL-SCNC: 103 MMOL/L (ref 98–107)
CO2 SERPL-SCNC: 28 MMOL/L (ref 21–32)
CREAT SERPL-MCNC: 0.74 MG/DL (ref 0.5–1.05)
EGFRCR SERPLBLD CKD-EPI 2021: 85 ML/MIN/1.73M*2
GGT SERPL-CCNC: 15 U/L (ref 5–55)
GLUCOSE SERPL-MCNC: 99 MG/DL (ref 74–99)
POTASSIUM SERPL-SCNC: 3.9 MMOL/L (ref 3.5–5.3)
PROT SERPL-MCNC: 6.9 G/DL (ref 6.4–8.2)
SODIUM SERPL-SCNC: 139 MMOL/L (ref 136–145)

## 2024-06-18 PROCEDURE — 82977 ASSAY OF GGT: CPT

## 2024-06-18 PROCEDURE — 99214 OFFICE O/P EST MOD 30 MIN: CPT | Performed by: FAMILY MEDICINE

## 2024-06-18 PROCEDURE — 80053 COMPREHEN METABOLIC PANEL: CPT

## 2024-06-18 PROCEDURE — 3074F SYST BP LT 130 MM HG: CPT | Performed by: FAMILY MEDICINE

## 2024-06-18 PROCEDURE — 1159F MED LIST DOCD IN RCRD: CPT | Performed by: FAMILY MEDICINE

## 2024-06-18 PROCEDURE — 3078F DIAST BP <80 MM HG: CPT | Performed by: FAMILY MEDICINE

## 2024-06-18 PROCEDURE — 1036F TOBACCO NON-USER: CPT | Performed by: FAMILY MEDICINE

## 2024-06-18 PROCEDURE — 36415 COLL VENOUS BLD VENIPUNCTURE: CPT

## 2024-06-18 PROCEDURE — 1160F RVW MEDS BY RX/DR IN RCRD: CPT | Performed by: FAMILY MEDICINE

## 2024-06-18 RX ORDER — AMLODIPINE BESYLATE 5 MG/1
5 TABLET ORAL DAILY
COMMUNITY

## 2024-06-18 NOTE — PROGRESS NOTES
"Subjective   Patient ID: Penny Quevedo is a 73 y.o. female who presents for Follow-up (6 mo).    HPI   Since the last office visit there have been no interval operations, hospitalizations, important illnesses or injuries, march fracture ankle at [employer].    HTN-Takes and tolerates meds without side effects. No alcohol. no tobacco. no exercise. low salt.  Reviewed recommendation for 150 minutes of exercise per week including 2 days of weight training if over age 50  Hyperlipidemia- on statin and prudent diet  Alkaline phosphatase is just a couple points elevated but persists we will recheck with GGTP and if persistence continues we will need to workup.  The elevation was before she had ankle fracture  Review of Systems  General-no fatigue weight to within 10 pounds  ENT no problems with vision swallowing  Cardiac no chest pains palpitations change in exercise tolerance or capacity  Pulmonary no cough shortness of breath  GI no heartburn or abdominal pain  Musculoskeletal no joint pains  Objective   /62   Pulse 71   Ht 1.6 m (5' 3\")   Wt 63 kg (138 lb 14.4 oz)   SpO2 97%   BMI 24.61 kg/m²     Physical Exam  General:  Alert, No acute distress. Appears stated age  Eye:  Pupils are equal, round and reactive to light, Extraocular movements are intact, Normal conjunctiva.    Neck:  Supple, Non-tender, No carotid bruit, No jugular venous distention, No lymphadenopathy, No thyromegaly.    Respiratory:  Lungs are clear to auscultation, Respirations are non-labored, Breath sounds are equal.    Cardiovascular:  Normal rate, Regular rhythm, No murmur.    Gastrointestinal:  Soft, Non-tender, No organomegaly. No solid or pulsatile mass  Integumentary:  Warm, Dry. No concerning lesions on exposed areas  Neurologic:  Alert, Oriented.  Gross and fine motor intact, CN 2-12 intact  Psychiatric:  Cooperative, Appropriate mood & affect.  Assessment/Plan   Problem List Items Addressed This Visit             ICD-10-CM    " Benign hypertension - Primary I10    Hyperlipidemia E78.5     Other Visit Diagnoses         Codes    Elevated alkaline phosphatase level     R74.8

## 2024-06-24 ENCOUNTER — APPOINTMENT (OUTPATIENT)
Dept: PRIMARY CARE | Facility: CLINIC | Age: 74
End: 2024-06-24
Payer: MEDICARE

## 2024-06-24 DIAGNOSIS — I10 BENIGN HYPERTENSION: Primary | ICD-10-CM

## 2024-06-24 RX ORDER — AMLODIPINE BESYLATE 5 MG/1
5 TABLET ORAL DAILY
Qty: 90 TABLET | Refills: 3 | Status: SHIPPED | OUTPATIENT
Start: 2024-06-24 | End: 2025-06-24

## 2024-09-13 ENCOUNTER — OFFICE VISIT (OUTPATIENT)
Dept: URGENT CARE | Facility: CLINIC | Age: 74
End: 2024-09-13
Payer: MEDICARE

## 2024-09-13 VITALS
SYSTOLIC BLOOD PRESSURE: 117 MMHG | BODY MASS INDEX: 24.45 KG/M2 | WEIGHT: 138 LBS | HEIGHT: 63 IN | RESPIRATION RATE: 16 BRPM | OXYGEN SATURATION: 95 % | HEART RATE: 73 BPM | TEMPERATURE: 98.3 F | DIASTOLIC BLOOD PRESSURE: 73 MMHG

## 2024-09-13 DIAGNOSIS — R30.0 DYSURIA: Primary | ICD-10-CM

## 2024-09-13 LAB
POC APPEARANCE, URINE: ABNORMAL
POC BILIRUBIN, URINE: NEGATIVE
POC BLOOD, URINE: ABNORMAL
POC COLOR, URINE: YELLOW
POC GLUCOSE, URINE: NEGATIVE MG/DL
POC KETONES, URINE: NEGATIVE MG/DL
POC LEUKOCYTES, URINE: ABNORMAL
POC NITRITE,URINE: NEGATIVE
POC PH, URINE: 7 PH
POC PROTEIN, URINE: NEGATIVE MG/DL
POC SPECIFIC GRAVITY, URINE: 1.01
POC UROBILINOGEN, URINE: 0.2 EU/DL

## 2024-09-13 PROCEDURE — 87086 URINE CULTURE/COLONY COUNT: CPT

## 2024-09-13 PROCEDURE — 99213 OFFICE O/P EST LOW 20 MIN: CPT | Performed by: NURSE PRACTITIONER

## 2024-09-13 PROCEDURE — 81003 URINALYSIS AUTO W/O SCOPE: CPT | Mod: QW | Performed by: NURSE PRACTITIONER

## 2024-09-13 RX ORDER — CIPROFLOXACIN 500 MG/1
500 TABLET ORAL 2 TIMES DAILY
Qty: 14 TABLET | Refills: 0 | Status: SHIPPED | OUTPATIENT
Start: 2024-09-13 | End: 2024-09-20

## 2024-09-13 NOTE — PROGRESS NOTES
[facility] URGENT CARE  Krys Adame, APRN-CNP     Visit Note - 9/13/2024 2:50 PM   This note was generated with voice recognition software and may contain errors including spelling, grammar, syntax, and misrecognization of what was dictated.    Patient: Penny Quevedo, MRN: [id number], 74 y.o., female   PCP: Viktor Mohamud MD  ------------------------------------  ALLERGIES: No Known Allergies     CURRENT MEDICATIONS:   Current Outpatient Medications   Medication Instructions    amLODIPine (NORVASC) 5 mg, oral, Daily    aspirin 81 mg EC tablet 1 tablet, oral, Daily    atorvastatin (LIPITOR) 40 mg, oral, Daily    ciprofloxacin (CIPRO) 500 mg, oral, 2 times daily    latanoprost (Xalatan) 0.005 % ophthalmic solution ophthalmic (eye)     ------------------------------------  PAST MEDICAL HX:  Patient Active Problem List   Diagnosis    Acute cystitis with hematuria    Benign hypertension    Glaucoma    Hyperlipidemia    Increased frequency of urination    Lumbar radicular pain    Memory loss    Precordial pain    Renal cyst    PILAR (stress urinary incontinence, female)    Encounter for screening mammogram for malignant neoplasm of breast      SURGICAL HX:  Past Surgical History:   Procedure Laterality Date    ANKLE FRACTURE SURGERY Right 03/15/2024    OTHER SURGICAL HISTORY  01/28/2020    Dilation and curettage    OTHER SURGICAL HISTORY  01/28/2020    Tonsillectomy with adenoidectomy    OTHER SURGICAL HISTORY  01/28/2020    Appendectomy    OTHER SURGICAL HISTORY  08/28/2020    Colonoscopy      FAMILY HX:   No pertinent history.   SOCIAL HX:    reports that she has never smoked. She has never used smokeless tobacco. .   ------------------------------------  CHIEF COMPLAINT:   Chief Complaint   Patient presents with    UTI     Urinary frequency, dysuria x 1 week       HISTORY OF PRESENT ILLNESS: The history was obtained from patient. Penny is a 74 y.o. female, who presents with a chief complaint  "of dysuria x approx 4-6 days. Has had burning with urination, urinary frequency, and urinary urgency; has also had mild body aches, feels a little bloated/pressure in lower abdomen, and had pain in her low back last night that kept her up, although discomfort is currently minimal. She denies any fever/chills, blood in urine, malaise, lethargy, nausea/vomiting, abdominal \"pain,\" and vaginal symptoms. Denies any changes in mental status. Patient reports has had similar symptoms in the past and was diagnosed with a UTI, although \"it has been a while.\" Has not tried any OTC medications or conservative measures for her symptoms. No recent antibiotic use. Requesting rx for Cipro, which has provided relief in the past.     REVIEW OF SYSTEMS:  10 systems reviewed negative with exception of history of present illness as listed above.    TODAY'S VITALS: /73 (BP Location: Left arm, Patient Position: Sitting, BP Cuff Size: Large adult)   Pulse 73   Temp 36.8 °C (98.3 °F) (Temporal)   Resp 16   Ht 1.6 m (5' 3\")   Wt 62.6 kg (138 lb)   SpO2 95%   BMI 24.45 kg/m²     PHYSICAL EXAMINATION:  General: Pleasant, well-nourished female; alert and oriented, in no acute distress. Sitting comfortably on exam table.  Accompanied by her .   Eyes:  Pupils equal, round and reactive to light. Non-icteric; conjunctiva clear.   HENT: Normocephalic. Oral mucosa moist.   Neck:  Supple. No lymphadenopathy.  Respiratory:  Lungs are clear to auscultation; no wheezes, rhonchi, or rales. Respirations unlabored, Breath sounds are equal, Symmetrical chest wall expansion.  Cardiovascular:  Regular rate, Regular rhythm. Normal S1S2. No m/r/g.  Gastrointestinal:  Soft, non-tender, non-distended; no palpable masses or organomegaly. Bowel sounds normoactive. + mild CVA tenderness bilat.  Musculoskeletal:  Grossly normal; appropriate for age.     Integumentary:  Pink, warm, dry, and intact. No rashes or skin discoloration appreciated. Good " skin turgor.  Neurologic:  Alert and oriented; grossly intact.    Cognition and Speech:  Oriented, Speech clear and coherent.    Psychiatric:  Cooperative, Appropriate mood & affect.       ------------------------------------  Medical Decision Making  LABORATORY or RADIOLOGICAL IMAGING ORDERS/RESULTS:   Urine Culture done - results pending.  Recent Results (from the past 24 hour(s))   POCT UA Automated manually resulted    Collection Time: 09/13/24  2:57 PM   Result Value Ref Range    POC Color, Urine Yellow Straw, Yellow, Light-Yellow    POC Appearance, Urine Cloudy (A) Clear    POC Glucose, Urine NEGATIVE NEGATIVE mg/dl    POC Bilirubin, Urine NEGATIVE NEGATIVE    POC Ketones, Urine NEGATIVE NEGATIVE mg/dl    POC Specific Gravity, Urine 1.015 1.005 - 1.035    POC Blood, Urine MODERATE (2+) (A) NEGATIVE    POC PH, Urine 7.0 No Reference Range Established PH    POC Protein, Urine NEGATIVE NEGATIVE, 30 (1+) mg/dl    POC Urobilinogen, Urine 0.2 0.2, 1.0 EU/DL    Poc Nitrite, Urine NEGATIVE NEGATIVE    POC Leukocytes, Urine LARGE (3+) (A) NEGATIVE       IMPRESSION/PLAN:  Course: Worsening; stable    1. Dysuria  - POCT UA Automated manually resulted  - Urine Culture  - ciprofloxacin (Cipro) 500 mg tablet; Take 1 tablet (500 mg) by mouth 2 times a day for 7 days.  Dispense: 14 tablet; Refill: 0    Symptoms/exam consistent with UTI with concerns for ?early pyelonephritis, although reviewed other potential etiologies. Has mild CVA tenderness, but is afebrile, and no other red flags on exam. Patient will be discharged home with oral antibiotics (Cipro, per her specific request - is aware of risks). Instructed to begin antibiotic ASAP (reviewed expectations and common side effects of treatment), and complete full course of medication, even if symptoms resolve more quickly. Also advised to push fluids, rest, and to use appropriate over the counter medications for management of symptoms - cranberry juice/pills may be helpful.  Advised to follow-up with primary care provider or ER ASAP if develops fever/chills, body aches, malaise, back/abdominal pain, nausea/vomiting, mental status changes, or if additional concerns/red flags develop. Reviewed strategies for UTI prevention in the future. Patient agreed with plan of care; questions were encouraged and answered.    Sending urine for culture to ensure appropriate antibiotic coverage - will contact with results/recommendations.       JUANITO Lopez-CNP   Advanced Practice Provider  [facility] URGENT CARE

## 2024-09-15 LAB — BACTERIA UR CULT: ABNORMAL

## 2024-09-16 ENCOUNTER — TELEPHONE (OUTPATIENT)
Dept: URGENT CARE | Facility: CLINIC | Age: 74
End: 2024-09-16
Payer: MEDICARE

## 2024-09-16 LAB — BACTERIA UR CULT: ABNORMAL

## 2024-10-07 ENCOUNTER — HOSPITAL ENCOUNTER (OUTPATIENT)
Dept: HOSPITAL 100 - LAB | Age: 74
Discharge: HOME | End: 2024-10-07
Payer: MEDICARE

## 2024-10-07 DIAGNOSIS — Z01.818: Primary | ICD-10-CM

## 2024-10-07 LAB
ANION GAP: 9 (ref 5–15)
BUN SERPL-MCNC: 18 MG/DL (ref 7–18)
BUN/CREAT RATIO: 22.5 RATIO (ref 10–20)
CALCIUM SERPL-MCNC: 10.1 MG/DL (ref 8.5–10.1)
CARBON DIOXIDE: 27 MMOL/L (ref 21–32)
CHLORIDE: 103 MMOL/L (ref 98–107)
EST GLOM FILT RATE - AFR AMER: 90 ML/MIN (ref 60–?)
GLUCOSE: 90 MG/DL (ref 74–106)
POTASSIUM: 3.8 MMOL/L (ref 3.5–5.1)

## 2024-10-07 PROCEDURE — 36415 COLL VENOUS BLD VENIPUNCTURE: CPT

## 2024-10-07 PROCEDURE — 80048 BASIC METABOLIC PNL TOTAL CA: CPT

## 2024-10-08 ENCOUNTER — APPOINTMENT (OUTPATIENT)
Age: 74
End: 2024-10-08
Payer: MEDICARE

## 2024-12-02 ENCOUNTER — TELEPHONE (OUTPATIENT)
Age: 74
End: 2024-12-02
Payer: MEDICARE

## 2024-12-02 DIAGNOSIS — E78.2 MIXED HYPERLIPIDEMIA: Primary | ICD-10-CM

## 2024-12-02 NOTE — TELEPHONE ENCOUNTER
Pt called asking if you wanted any labs drawn since her last labs. Has upcoming appt on 12/19/24. Please advise.

## 2024-12-17 ENCOUNTER — LAB (OUTPATIENT)
Facility: LAB | Age: 74
End: 2024-12-17
Payer: MEDICARE

## 2024-12-17 DIAGNOSIS — E78.2 MIXED HYPERLIPIDEMIA: ICD-10-CM

## 2024-12-17 LAB
ALBUMIN SERPL BCP-MCNC: 4.1 G/DL (ref 3.4–5)
ALP SERPL-CCNC: 145 U/L (ref 33–136)
ALT SERPL W P-5'-P-CCNC: 16 U/L (ref 7–45)
ANION GAP SERPL CALC-SCNC: 10 MMOL/L (ref 10–20)
AST SERPL W P-5'-P-CCNC: 19 U/L (ref 9–39)
BILIRUB SERPL-MCNC: 0.7 MG/DL (ref 0–1.2)
BUN SERPL-MCNC: 15 MG/DL (ref 6–23)
CALCIUM SERPL-MCNC: 9.3 MG/DL (ref 8.6–10.3)
CHLORIDE SERPL-SCNC: 104 MMOL/L (ref 98–107)
CHOLEST SERPL-MCNC: 131 MG/DL (ref 0–199)
CHOLESTEROL/HDL RATIO: 2
CO2 SERPL-SCNC: 31 MMOL/L (ref 21–32)
CREAT SERPL-MCNC: 0.69 MG/DL (ref 0.5–1.05)
EGFRCR SERPLBLD CKD-EPI 2021: >90 ML/MIN/1.73M*2
ERYTHROCYTE [DISTWIDTH] IN BLOOD BY AUTOMATED COUNT: 13.3 % (ref 11.5–14.5)
GLUCOSE SERPL-MCNC: 90 MG/DL (ref 74–99)
HCT VFR BLD AUTO: 43 % (ref 36–46)
HDLC SERPL-MCNC: 64 MG/DL
HGB BLD-MCNC: 14 G/DL (ref 12–16)
LDLC SERPL CALC-MCNC: 53 MG/DL
MCH RBC QN AUTO: 29.6 PG (ref 26–34)
MCHC RBC AUTO-ENTMCNC: 32.6 G/DL (ref 32–36)
MCV RBC AUTO: 91 FL (ref 80–100)
NON HDL CHOLESTEROL: 67 MG/DL (ref 0–149)
NRBC BLD-RTO: 0 /100 WBCS (ref 0–0)
PLATELET # BLD AUTO: 190 X10*3/UL (ref 150–450)
POTASSIUM SERPL-SCNC: 4 MMOL/L (ref 3.5–5.3)
PROT SERPL-MCNC: 6.4 G/DL (ref 6.4–8.2)
RBC # BLD AUTO: 4.73 X10*6/UL (ref 4–5.2)
SODIUM SERPL-SCNC: 141 MMOL/L (ref 136–145)
TRIGL SERPL-MCNC: 70 MG/DL (ref 0–149)
VLDL: 14 MG/DL (ref 0–40)
WBC # BLD AUTO: 5 X10*3/UL (ref 4.4–11.3)

## 2024-12-17 PROCEDURE — 36415 COLL VENOUS BLD VENIPUNCTURE: CPT

## 2024-12-17 PROCEDURE — 80053 COMPREHEN METABOLIC PANEL: CPT

## 2024-12-17 PROCEDURE — 80061 LIPID PANEL: CPT

## 2024-12-17 PROCEDURE — 85027 COMPLETE CBC AUTOMATED: CPT

## 2024-12-19 ENCOUNTER — APPOINTMENT (OUTPATIENT)
Dept: PRIMARY CARE | Facility: CLINIC | Age: 74
End: 2024-12-19
Payer: MEDICARE

## 2024-12-19 VITALS
WEIGHT: 138 LBS | OXYGEN SATURATION: 96 % | HEART RATE: 69 BPM | BODY MASS INDEX: 24.45 KG/M2 | SYSTOLIC BLOOD PRESSURE: 120 MMHG | DIASTOLIC BLOOD PRESSURE: 66 MMHG

## 2024-12-19 DIAGNOSIS — E78.2 MIXED HYPERLIPIDEMIA: ICD-10-CM

## 2024-12-19 DIAGNOSIS — N30.01 ACUTE CYSTITIS WITH HEMATURIA: ICD-10-CM

## 2024-12-19 DIAGNOSIS — R74.8 ELEVATED ALKALINE PHOSPHATASE LEVEL: ICD-10-CM

## 2024-12-19 DIAGNOSIS — Z00.00 ROUTINE GENERAL MEDICAL EXAMINATION AT HEALTH CARE FACILITY: Primary | ICD-10-CM

## 2024-12-19 DIAGNOSIS — I10 BENIGN HYPERTENSION: ICD-10-CM

## 2024-12-19 PROCEDURE — 1159F MED LIST DOCD IN RCRD: CPT | Performed by: FAMILY MEDICINE

## 2024-12-19 PROCEDURE — G0439 PPPS, SUBSEQ VISIT: HCPCS | Performed by: FAMILY MEDICINE

## 2024-12-19 PROCEDURE — 3074F SYST BP LT 130 MM HG: CPT | Performed by: FAMILY MEDICINE

## 2024-12-19 PROCEDURE — 3078F DIAST BP <80 MM HG: CPT | Performed by: FAMILY MEDICINE

## 2024-12-19 PROCEDURE — 1160F RVW MEDS BY RX/DR IN RCRD: CPT | Performed by: FAMILY MEDICINE

## 2024-12-19 PROCEDURE — 1036F TOBACCO NON-USER: CPT | Performed by: FAMILY MEDICINE

## 2024-12-19 PROCEDURE — 1170F FXNL STATUS ASSESSED: CPT | Performed by: FAMILY MEDICINE

## 2024-12-19 PROCEDURE — 1124F ACP DISCUSS-NO DSCNMKR DOCD: CPT | Performed by: FAMILY MEDICINE

## 2024-12-19 PROCEDURE — 99214 OFFICE O/P EST MOD 30 MIN: CPT | Performed by: FAMILY MEDICINE

## 2024-12-19 RX ORDER — CIPROFLOXACIN 250 MG/1
250 TABLET, FILM COATED ORAL 2 TIMES DAILY
Qty: 14 TABLET | Refills: 1 | Status: SHIPPED | OUTPATIENT
Start: 2024-12-19 | End: 2024-12-26

## 2024-12-19 RX ORDER — ATORVASTATIN CALCIUM 40 MG/1
40 TABLET, FILM COATED ORAL DAILY
Qty: 90 TABLET | Refills: 3 | Status: SHIPPED | OUTPATIENT
Start: 2024-12-19 | End: 2025-12-19

## 2024-12-19 ASSESSMENT — ENCOUNTER SYMPTOMS
DEPRESSION: 0
OCCASIONAL FEELINGS OF UNSTEADINESS: 0
LOSS OF SENSATION IN FEET: 0

## 2024-12-19 ASSESSMENT — ACTIVITIES OF DAILY LIVING (ADL)
TAKING_MEDICATION: INDEPENDENT
BATHING: INDEPENDENT
GROCERY_SHOPPING: INDEPENDENT
DRESSING: INDEPENDENT
MANAGING_FINANCES: INDEPENDENT
DOING_HOUSEWORK: INDEPENDENT

## 2024-12-19 ASSESSMENT — PATIENT HEALTH QUESTIONNAIRE - PHQ9
1. LITTLE INTEREST OR PLEASURE IN DOING THINGS: NOT AT ALL
2. FEELING DOWN, DEPRESSED OR HOPELESS: NOT AT ALL
SUM OF ALL RESPONSES TO PHQ9 QUESTIONS 1 AND 2: 0

## 2024-12-19 NOTE — ASSESSMENT & PLAN NOTE
Orders:    Follow Up In Primary Care    atorvastatin (Lipitor) 40 mg tablet; Take 1 tablet (40 mg) by mouth once daily.    Follow Up In Primary Care; Future

## 2024-12-19 NOTE — PROGRESS NOTES
Subjective   Reason for Visit: Penny Quevedo is an 74 y.o. female here for a Medicare Wellness visit.     Past Medical, Surgical, and Family History reviewed and updated in chart.    Reviewed all medications by prescribing practitioner or clinical pharmacist (such as prescriptions, OTCs, herbal therapies and supplements) and documented in the medical record.    HPI  Since the last office visit there have been no interval operations, hospitalizations, important illnesses or injuries.   Had uti   Had trigger finger surgery  HTN-Takes and tolerates meds without side effects. No alcohol. no tobacco. some exercise. low salt.  Reviewed recommendation for 150 minutes of exercise per week including 2 days of weight training if over age 50  Hyperlipidemia- is on a statin and a prudent diet.      Patient Care Team:  Viktor Mohamud MD as PCP - General (Family Medicine)  Viktor Mohamud MD as PCP - [employer] PCP     Review of Systems  General-no fatigue weight to within 10 pounds  ENT no problems with vision swallowing  Cardiac no chest pains palpitations change in exercise tolerance or capacity  Pulmonary no cough shortness of breath  GI no heartburn or abdominal pain  Musculoskeletal no joint pains  Objective   Vitals:  /66   Pulse 69   Wt 62.6 kg (138 lb)   SpO2 96%   BMI 24.45 kg/m²       Physical Exam  General:  Alert, No acute distress. Appears stated age  Eye:  Pupils are equal, round and reactive to light, Extraocular movements are intact, Normal conjunctiva.    Neck:  Supple, Non-tender, No carotid bruit, No jugular venous distention, No lymphadenopathy, No thyromegaly.    Respiratory:  Lungs are clear to auscultation, Respirations are non-labored, Breath sounds are equal.    Cardiovascular:  Normal rate, Regular rhythm, No murmur.    Gastrointestinal:  Soft, Non-tender, No organomegaly. No solid or pulsatile mass  Integumentary:  Warm, Dry. No concerning lesions on exposed  areas  Neurologic:  Alert, Oriented.  Gross and fine motor intact, CN 2-12 intact  Psychiatric:  Cooperative, Appropriate mood & affect.  Assessment & Plan  Routine general medical examination at health care facility    Orders:    1 Year Follow Up In Primary Care - Wellness Exam; Future    Follow Up In Primary Care; Future    Benign hypertension    Orders:    Follow Up In Primary Care    Follow Up In Primary Care; Future    Mixed hyperlipidemia    Orders:    Follow Up In Primary Care    atorvastatin (Lipitor) 40 mg tablet; Take 1 tablet (40 mg) by mouth once daily.    Follow Up In Primary Care; Future    Elevated alkaline phosphatase level    Orders:    Follow Up In Primary Care    Follow Up In Primary Care; Future    Acute cystitis with hematuria    Orders:    ciprofloxacin (Cipro) 250 mg tablet; Take 1 tablet (250 mg) by mouth 2 times a day for 7 days.    Follow Up In Primary Care; Future

## 2024-12-19 NOTE — ASSESSMENT & PLAN NOTE
Orders:    ciprofloxacin (Cipro) 250 mg tablet; Take 1 tablet (250 mg) by mouth 2 times a day for 7 days.    Follow Up In Primary Care; Future

## 2025-06-26 ENCOUNTER — APPOINTMENT (OUTPATIENT)
Age: 75
End: 2025-06-26
Payer: MEDICARE

## 2025-06-26 VITALS
DIASTOLIC BLOOD PRESSURE: 64 MMHG | WEIGHT: 136 LBS | HEART RATE: 67 BPM | BODY MASS INDEX: 24.09 KG/M2 | OXYGEN SATURATION: 95 % | SYSTOLIC BLOOD PRESSURE: 130 MMHG

## 2025-06-26 DIAGNOSIS — R73.02 IGT (IMPAIRED GLUCOSE TOLERANCE): ICD-10-CM

## 2025-06-26 DIAGNOSIS — Z00.00 ROUTINE GENERAL MEDICAL EXAMINATION AT HEALTH CARE FACILITY: ICD-10-CM

## 2025-06-26 DIAGNOSIS — E03.9 COMPENSATED HYPOTHYROIDISM: ICD-10-CM

## 2025-06-26 DIAGNOSIS — R74.8 ELEVATED ALKALINE PHOSPHATASE LEVEL: ICD-10-CM

## 2025-06-26 DIAGNOSIS — N30.01 ACUTE CYSTITIS WITH HEMATURIA: ICD-10-CM

## 2025-06-26 DIAGNOSIS — G62.9 PERIPHERAL POLYNEUROPATHY: ICD-10-CM

## 2025-06-26 DIAGNOSIS — I10 BENIGN HYPERTENSION: Primary | ICD-10-CM

## 2025-06-26 DIAGNOSIS — E78.2 MIXED HYPERLIPIDEMIA: ICD-10-CM

## 2025-06-26 PROCEDURE — 3078F DIAST BP <80 MM HG: CPT | Performed by: FAMILY MEDICINE

## 2025-06-26 PROCEDURE — 1160F RVW MEDS BY RX/DR IN RCRD: CPT | Performed by: FAMILY MEDICINE

## 2025-06-26 PROCEDURE — 3075F SYST BP GE 130 - 139MM HG: CPT | Performed by: FAMILY MEDICINE

## 2025-06-26 PROCEDURE — 99214 OFFICE O/P EST MOD 30 MIN: CPT | Performed by: FAMILY MEDICINE

## 2025-06-26 PROCEDURE — 1159F MED LIST DOCD IN RCRD: CPT | Performed by: FAMILY MEDICINE

## 2025-06-26 PROCEDURE — 1036F TOBACCO NON-USER: CPT | Performed by: FAMILY MEDICINE

## 2025-06-26 RX ORDER — AMLODIPINE BESYLATE 5 MG/1
5 TABLET ORAL DAILY
Qty: 90 TABLET | Refills: 3 | Status: SHIPPED | OUTPATIENT
Start: 2025-06-26 | End: 2026-06-26

## 2025-06-26 NOTE — PROGRESS NOTES
Subjective   Patient ID: Penny Quevedo is a 74 y.o. female who presents for Follow-up (6 mo).    HPI   HTN-Takes and tolerates meds without side effects. No alcohol. no tobacco. no exercise. low salt.  Reviewed recommendation for 150 minutes of exercise per week including 2 days of weight training if over age 50  Tingling in tips of toes, will check B12 TSH A1c and if she is agreeable can be referred to neurology.  At this point she is not ready to be referred  Review of Systems  General-no fatigue weight to within 10 pounds  ENT no problems with vision swallowing  Cardiac no chest pains palpitations change in exercise tolerance or capacity  Pulmonary no cough shortness of breath  GI no heartburn or abdominal pain  Musculoskeletal no joint pains  Objective   /64   Pulse 67   Wt 61.7 kg (136 lb)   SpO2 95%   BMI 24.09 kg/m²     Physical Exam  General:  Alert, No acute distress. Appears stated age  Eye:  Pupils are equal, round and reactive to light, Extraocular movements are intact, Normal conjunctiva.    Neck:  Supple, Non-tender, No carotid bruit, No jugular venous distention, No lymphadenopathy, No thyromegaly.    Respiratory:  Lungs are clear to auscultation, Respirations are non-labored, Breath sounds are equal.    Cardiovascular:  Normal rate, Regular rhythm, No murmur.    Gastrointestinal:  Soft, Non-tender, No organomegaly. No solid or pulsatile mass  Integumentary:  Warm, Dry. No concerning lesions on exposed areas  Neurologic:  Alert, Oriented.  Gross and fine motor intact, CN 2-12 intact.  Monofilament testing at the toes is intact  Psychiatric:  Cooperative, Appropriate mood & affect.  Assessment/Plan   Problem List Items Addressed This Visit           ICD-10-CM    Acute cystitis with hematuria N30.01    Relevant Orders    Follow Up In Primary Care    Benign hypertension - Primary I10    Relevant Medications    amLODIPine (Norvasc) 5 mg tablet    Other Relevant Orders    Follow Up In Primary  Care    CBC    Comprehensive Metabolic Panel    Hyperlipidemia E78.5    Relevant Orders    Follow Up In Primary Care    CBC    Comprehensive Metabolic Panel    Lipid Panel     Other Visit Diagnoses         Codes      Elevated alkaline phosphatase level     R74.8    Relevant Orders    Follow Up In Primary Care      Routine general medical examination at health care facility     Z00.00    Relevant Orders    Follow Up In Primary Care      IGT (impaired glucose tolerance)     R73.02    Relevant Orders    Follow Up In Primary Care    Hemoglobin A1C    Thyroid Stimulating Hormone    Vitamin B12      Peripheral polyneuropathy     G62.9    Relevant Orders    Thyroid Stimulating Hormone      Compensated hypothyroidism     E03.9    Relevant Orders    Thyroid Stimulating Hormone             Patient was identified as a fall risk. Risk prevention instructions provided.

## 2025-07-03 DIAGNOSIS — N39.0 ACUTE UTI: Primary | ICD-10-CM

## 2025-07-03 RX ORDER — CIPROFLOXACIN 250 MG/1
250 TABLET, FILM COATED ORAL 2 TIMES DAILY
Qty: 14 TABLET | Refills: 1 | Status: SHIPPED | OUTPATIENT
Start: 2025-07-03 | End: 2025-07-10

## 2025-07-22 ENCOUNTER — RESULTS FOLLOW-UP (OUTPATIENT)
Age: 75
End: 2025-07-22
Payer: MEDICARE

## 2025-07-22 LAB
ESTIMATED AVERAGE GLUCOSE FOR HBA1C EXTERNAL: 120 MG/DL
HEMOGLOBIN A1C/HEMOGLOBIN TOTAL IN BLOOD EXTERNAL: 5.8 %

## 2025-07-22 NOTE — TELEPHONE ENCOUNTER
----- Message from Viktor Mohamud sent at 7/22/2025  1:12 PM EDT -----  Let patient know A1c is not diabetic  ----- Message -----  From: Rose Valdez MA  Sent: 7/22/2025  10:28 AM EDT  To: Viktor Mohamud MD    Pt notified. Waiting on results of her labs. Requested results faxed from [facility]. Had labs drawn when we were having technical issues.

## 2026-01-09 ENCOUNTER — APPOINTMENT (OUTPATIENT)
Age: 76
End: 2026-01-09
Payer: MEDICARE